# Patient Record
Sex: MALE | Race: WHITE | NOT HISPANIC OR LATINO | URBAN - METROPOLITAN AREA
[De-identification: names, ages, dates, MRNs, and addresses within clinical notes are randomized per-mention and may not be internally consistent; named-entity substitution may affect disease eponyms.]

---

## 2017-01-01 ENCOUNTER — EMERGENCY (EMERGENCY)
Facility: HOSPITAL | Age: 27
LOS: 1 days | Discharge: HOME | End: 2017-01-01
Attending: EMERGENCY MEDICINE
Payer: MEDICAID

## 2017-01-01 VITALS
SYSTOLIC BLOOD PRESSURE: 148 MMHG | TEMPERATURE: 98 F | OXYGEN SATURATION: 98 % | HEART RATE: 90 BPM | RESPIRATION RATE: 20 BRPM | DIASTOLIC BLOOD PRESSURE: 88 MMHG

## 2017-01-01 PROCEDURE — 99284 EMERGENCY DEPT VISIT MOD MDM: CPT | Mod: 25

## 2017-01-01 PROCEDURE — 73080 X-RAY EXAM OF ELBOW: CPT | Mod: 26,RT

## 2017-01-01 PROCEDURE — 10061 I&D ABSCESS COMP/MULTIPLE: CPT

## 2017-01-01 RX ADMIN — Medication 100 MILLIGRAM(S): at 12:27

## 2017-01-01 NOTE — ED PROVIDER NOTE - ATTENDING CONTRIBUTION TO CARE
I personally saw the patient with the PA, and completed the key components of the history and physical exam. I then discussed the management plan with the PA.   gen in nad resp clear cardiac no murmur msk skin : + right forearm abscess no signs nec fasc I and d neurovasc intact opiod resources given

## 2017-01-01 NOTE — ED ADULT TRIAGE NOTE - NS ED TRIAGE AVPU SCALE
Unresponsive - The patient is nonverbal and does not respond even when a painful stimulus is applied.

## 2017-01-03 ENCOUNTER — INPATIENT (INPATIENT)
Facility: HOSPITAL | Age: 27
LOS: 3 days | Discharge: HOME | DRG: 571 | End: 2017-01-07
Attending: HOSPITALIST | Admitting: HOSPITALIST
Payer: MEDICAID

## 2017-01-03 VITALS
RESPIRATION RATE: 18 BRPM | TEMPERATURE: 98 F | OXYGEN SATURATION: 96 % | WEIGHT: 220.02 LBS | SYSTOLIC BLOOD PRESSURE: 128 MMHG | HEIGHT: 76 IN | HEART RATE: 75 BPM | DIASTOLIC BLOOD PRESSURE: 78 MMHG

## 2017-01-03 DIAGNOSIS — L02.91 CUTANEOUS ABSCESS, UNSPECIFIED: ICD-10-CM

## 2017-01-03 DIAGNOSIS — F11.10 OPIOID ABUSE, UNCOMPLICATED: ICD-10-CM

## 2017-01-03 LAB
ALBUMIN SERPL ELPH-MCNC: 4.1 G/DL — SIGNIFICANT CHANGE UP (ref 3.3–5.2)
ALP SERPL-CCNC: 52 U/L — SIGNIFICANT CHANGE UP (ref 40–120)
ALT FLD-CCNC: 17 U/L — SIGNIFICANT CHANGE UP
ANION GAP SERPL CALC-SCNC: 13 MMOL/L — SIGNIFICANT CHANGE UP (ref 5–17)
AST SERPL-CCNC: 16 U/L — SIGNIFICANT CHANGE UP
BASOPHILS # BLD AUTO: 0 K/UL — SIGNIFICANT CHANGE UP (ref 0–0.2)
BASOPHILS NFR BLD AUTO: 0.6 % — SIGNIFICANT CHANGE UP (ref 0–2)
BILIRUB SERPL-MCNC: 0.4 MG/DL — SIGNIFICANT CHANGE UP (ref 0.4–2)
BUN SERPL-MCNC: 8 MG/DL — SIGNIFICANT CHANGE UP (ref 8–20)
CALCIUM SERPL-MCNC: 9.4 MG/DL — SIGNIFICANT CHANGE UP (ref 8.6–10.2)
CHLORIDE SERPL-SCNC: 99 MMOL/L — SIGNIFICANT CHANGE UP (ref 98–107)
CO2 SERPL-SCNC: 29 MMOL/L — SIGNIFICANT CHANGE UP (ref 22–29)
CREAT SERPL-MCNC: 0.73 MG/DL — SIGNIFICANT CHANGE UP (ref 0.5–1.3)
EOSINOPHIL # BLD AUTO: 0.4 K/UL — SIGNIFICANT CHANGE UP (ref 0–0.5)
EOSINOPHIL NFR BLD AUTO: 5.9 % — SIGNIFICANT CHANGE UP (ref 0–6)
GLUCOSE SERPL-MCNC: 86 MG/DL — SIGNIFICANT CHANGE UP (ref 70–115)
HCT VFR BLD CALC: 37.3 % — LOW (ref 42–52)
HGB BLD-MCNC: 12.9 G/DL — LOW (ref 14–18)
LYMPHOCYTES # BLD AUTO: 2.3 K/UL — SIGNIFICANT CHANGE UP (ref 1–4.8)
LYMPHOCYTES # BLD AUTO: 35.8 % — SIGNIFICANT CHANGE UP (ref 20–55)
MCHC RBC-ENTMCNC: 29.1 PG — SIGNIFICANT CHANGE UP (ref 27–31)
MCHC RBC-ENTMCNC: 34.6 G/DL — SIGNIFICANT CHANGE UP (ref 32–36)
MCV RBC AUTO: 84.2 FL — SIGNIFICANT CHANGE UP (ref 80–94)
MONOCYTES # BLD AUTO: 0.6 K/UL — SIGNIFICANT CHANGE UP (ref 0–0.8)
MONOCYTES NFR BLD AUTO: 9.5 % — SIGNIFICANT CHANGE UP (ref 3–10)
NEUTROPHILS # BLD AUTO: 3.1 K/UL — SIGNIFICANT CHANGE UP (ref 1.8–8)
NEUTROPHILS NFR BLD AUTO: 48 % — SIGNIFICANT CHANGE UP (ref 37–73)
PLATELET # BLD AUTO: 262 K/UL — SIGNIFICANT CHANGE UP (ref 150–400)
POTASSIUM SERPL-MCNC: 4.6 MMOL/L — SIGNIFICANT CHANGE UP (ref 3.5–5.3)
POTASSIUM SERPL-SCNC: 4.6 MMOL/L — SIGNIFICANT CHANGE UP (ref 3.5–5.3)
PROT SERPL-MCNC: 7.9 G/DL — SIGNIFICANT CHANGE UP (ref 6.6–8.7)
RBC # BLD: 4.43 M/UL — LOW (ref 4.6–6.2)
RBC # FLD: 13.1 % — SIGNIFICANT CHANGE UP (ref 11–15.6)
SODIUM SERPL-SCNC: 141 MMOL/L — SIGNIFICANT CHANGE UP (ref 135–145)
WBC # BLD: 6.39 K/UL — SIGNIFICANT CHANGE UP (ref 4.8–10.8)
WBC # FLD AUTO: 6.39 K/UL — SIGNIFICANT CHANGE UP (ref 4.8–10.8)

## 2017-01-03 PROCEDURE — 10061 I&D ABSCESS COMP/MULTIPLE: CPT | Mod: 78

## 2017-01-03 PROCEDURE — 73201 CT UPPER EXTREMITY W/DYE: CPT | Mod: 26,RT

## 2017-01-03 PROCEDURE — 73080 X-RAY EXAM OF ELBOW: CPT | Mod: 26,RT

## 2017-01-03 PROCEDURE — 99223 1ST HOSP IP/OBS HIGH 75: CPT

## 2017-01-03 PROCEDURE — 99284 EMERGENCY DEPT VISIT MOD MDM: CPT | Mod: 25

## 2017-01-03 PROCEDURE — 73090 X-RAY EXAM OF FOREARM: CPT | Mod: 26,LT

## 2017-01-03 RX ORDER — IBUPROFEN 200 MG
1 TABLET ORAL
Qty: 12 | Refills: 0 | OUTPATIENT
Start: 2017-01-03 | End: 2017-01-06

## 2017-01-03 RX ORDER — VANCOMYCIN HCL 1 G
1500 VIAL (EA) INTRAVENOUS EVERY 12 HOURS
Qty: 0 | Refills: 0 | Status: DISCONTINUED | OUTPATIENT
Start: 2017-01-03 | End: 2017-01-04

## 2017-01-03 RX ORDER — PIPERACILLIN AND TAZOBACTAM 4; .5 G/20ML; G/20ML
3.38 INJECTION, POWDER, LYOPHILIZED, FOR SOLUTION INTRAVENOUS EVERY 8 HOURS
Qty: 0 | Refills: 0 | Status: DISCONTINUED | OUTPATIENT
Start: 2017-01-03 | End: 2017-01-04

## 2017-01-03 RX ORDER — ACETAMINOPHEN 500 MG
650 TABLET ORAL EVERY 6 HOURS
Qty: 0 | Refills: 0 | Status: DISCONTINUED | OUTPATIENT
Start: 2017-01-03 | End: 2017-01-04

## 2017-01-03 RX ORDER — SODIUM CHLORIDE 9 MG/ML
3 INJECTION INTRAMUSCULAR; INTRAVENOUS; SUBCUTANEOUS ONCE
Qty: 0 | Refills: 0 | Status: COMPLETED | OUTPATIENT
Start: 2017-01-03 | End: 2017-01-03

## 2017-01-03 RX ORDER — MORPHINE SULFATE 50 MG/1
4 CAPSULE, EXTENDED RELEASE ORAL ONCE
Qty: 0 | Refills: 0 | Status: DISCONTINUED | OUTPATIENT
Start: 2017-01-03 | End: 2017-01-03

## 2017-01-03 RX ORDER — PIPERACILLIN AND TAZOBACTAM 4; .5 G/20ML; G/20ML
3.38 INJECTION, POWDER, LYOPHILIZED, FOR SOLUTION INTRAVENOUS ONCE
Qty: 0 | Refills: 0 | Status: COMPLETED | OUTPATIENT
Start: 2017-01-03 | End: 2017-01-03

## 2017-01-03 RX ORDER — ENOXAPARIN SODIUM 100 MG/ML
40 INJECTION SUBCUTANEOUS EVERY 24 HOURS
Qty: 0 | Refills: 0 | Status: DISCONTINUED | OUTPATIENT
Start: 2017-01-03 | End: 2017-01-04

## 2017-01-03 RX ADMIN — Medication 100 MILLIGRAM(S): at 18:29

## 2017-01-03 RX ADMIN — PIPERACILLIN AND TAZOBACTAM 200 GRAM(S): 4; .5 INJECTION, POWDER, LYOPHILIZED, FOR SOLUTION INTRAVENOUS at 22:25

## 2017-01-03 RX ADMIN — SODIUM CHLORIDE 3 MILLILITER(S): 9 INJECTION INTRAMUSCULAR; INTRAVENOUS; SUBCUTANEOUS at 17:26

## 2017-01-03 NOTE — H&P ADULT. - RS GEN PE MLT RESP DETAILS PC
clear to auscultation bilaterally/good air movement/no rales/airway patent/no chest wall tenderness/no intercostal retractions/respirations non-labored/breath sounds equal

## 2017-01-03 NOTE — ED STATDOCS - ATTENDING CONTRIBUTION TO CARE
I, Lenore Rodriguez, performed the initial face to face bedside interview with this patient regarding history of present illness, review of symptoms and relevant past medical, social and family history.  I completed an independent physical examination.  I was the initial provider who evaluated this patient. I have signed out the follow up of any pending tests (i.e. labs, radiological studies) to the ACP.  I have communicated the patient’s plan of care and disposition with the ACP.  The history, relevant review of systems, past medical and surgical history, medical decision making, and physical examination was documented by the scribe in my presence and I attest to the accuracy of the documentation.

## 2017-01-03 NOTE — H&P ADULT. - HISTORY OF PRESENT ILLNESS
25 y/o M w/ hx heroine abuse, Presents to the ED c/o right arm abscess , that started 4 days ago, he came at  the ED on sunday. had it draines, and dc in po abx.. Pt comes back, beacuse it has worsen in size, and very tender to palpations. Denies fever, chills , or any other abscess,  cp, sob, diarrhea, dysuria or any other complaints/ he states he has had this in the past.

## 2017-01-03 NOTE — H&P ADULT. - PROBLEM SELECTOR PLAN 1
ortho consult to r/o septic joint  abscess in right antecubital fossa. 2/2 to iv drug abuse. ..s/p I&D  started on vanco/zosyn...right upper ext edema..  f/u r-ct upper ext..   f/u bc, abscess cultures ortho consult dr henderson to r/o septic joint  abscess in right antecubital fossa. 2/2 to iv drug abuse. ..s/p I&D  started on vanco/zosyn...right upper ext edema..  f/u r-ct upper ext..   f/u bc, abscess cultures

## 2017-01-03 NOTE — ED STATDOCS - PROGRESS NOTE DETAILS
exam unchanged from attendings---pmd is dr gruber---I&D today=initially slight amount of pus and serosanguanous fluid then just blood---d/w dr ying--will f/u labs and xray then admit pt for iv abx--pt also will talk to social work about possible placement in rehab prgm lungs=cta,no retarctions--child walking around in no distress--will recheck vitals and they are ok will d/c home

## 2017-01-03 NOTE — ED STATDOCS - NS ED MD SCRIBE ATTENDING SCRIBE SECTIONS
REVIEW OF SYSTEMS/VITAL SIGNS( Pullset)/PHYSICAL EXAM/DISPOSITION/PAST MEDICAL/SURGICAL/SOCIAL HISTORY/HISTORY OF PRESENT ILLNESS/HIV

## 2017-01-03 NOTE — H&P ADULT. - EXTREMITIES COMMENTS
right upper ext abscess at antecub fossa...approx 4cm, now s/p I&D..erthema, tender and warm to palp.

## 2017-01-03 NOTE — H&P ADULT. - ASSESSMENT
Wade Grace [id number] pmd    27 y/o M w/ hx heroine abuse, Presents to the ED c/o right arm abscess , that started 4 days ago, he came at  the ED on sunday. had it draines, and dc in po abx.. Pt comes back, beacuse it has worsen in size, and very tender to palpations. Denies fever, chills , or any other abscess,  cp, sob, diarrhea, dysuria or any other complaints/ he states he has had this in the past. Wade Grace [id number] pmd dr [facility] Ortho consult......  27 y/o M w/ hx heroine abuse, Presents to the ED c/o right arm abscess , that started 4 days ago, he came at  the ED on sunday. had it draines, and dc in po abx.. Pt comes back, beacuse it has worsen in size, and very tender to palpations. Denies fever, chills , or any other abscess,  cp, sob, diarrhea, dysuria or any other complaints/ he states he has had this in the past. Exam; s/p I7 Wade Grace [id number] pmd dr [facility] Ortho consult..dr Arzate....  27 y/o M w/ hx heroine abuse, Presents to the ED c/o right arm abscess , that started 4 days ago, he came at  the ED on sunday. had it draines, and dc in po abx.. Pt comes back, beacuse it has worsen in size, and very tender to palpations. Denies fever, chills , or any other abscess,  cp, sob, diarrhea, dysuria or any other complaints/ he states he has had this in the past. Exam; s/p I7

## 2017-01-03 NOTE — ED ADULT NURSE NOTE - OBJECTIVE STATEMENT
pt c/o worsening r arm  drainage with swelling and redness, pt had abscess drained and packed 2 days ago. no fevers

## 2017-01-03 NOTE — ED STATDOCS - OBJECTIVE STATEMENT
25 y/o M pt w/ PMHx of IV drug abuse presents to the ED c/o R arm drainage and redness x2 days. Pt states that he was seen at [facility] ED 2 days ago for an abscess on R arm; pt had I&D and packing placed, but pulled out packing due to not draining. Pt denies fever, chills, or any other complaints. NKDA.

## 2017-01-03 NOTE — ED ADULT NURSE NOTE - CAS EDN DISCHARGE ASSESSMENT
Patient baseline mental status/Awake/Alert and oriented to person, place and time/No adverse reaction to first time med in ED/Dressing clean and dry/Symptoms improved

## 2017-01-04 LAB
ALBUMIN SERPL ELPH-MCNC: 3.8 G/DL — SIGNIFICANT CHANGE UP (ref 3.3–5.2)
ALP SERPL-CCNC: 49 U/L — SIGNIFICANT CHANGE UP (ref 40–120)
ALT FLD-CCNC: 15 U/L — SIGNIFICANT CHANGE UP
ANION GAP SERPL CALC-SCNC: 13 MMOL/L — SIGNIFICANT CHANGE UP (ref 5–17)
ANISOCYTOSIS BLD QL: SLIGHT — SIGNIFICANT CHANGE UP
AST SERPL-CCNC: 13 U/L — SIGNIFICANT CHANGE UP
BILIRUB SERPL-MCNC: 0.3 MG/DL — LOW (ref 0.4–2)
BUN SERPL-MCNC: 7 MG/DL — LOW (ref 8–20)
CALCIUM SERPL-MCNC: 9.3 MG/DL — SIGNIFICANT CHANGE UP (ref 8.6–10.2)
CHLORIDE SERPL-SCNC: 99 MMOL/L — SIGNIFICANT CHANGE UP (ref 98–107)
CO2 SERPL-SCNC: 27 MMOL/L — SIGNIFICANT CHANGE UP (ref 22–29)
CREAT SERPL-MCNC: 0.68 MG/DL — SIGNIFICANT CHANGE UP (ref 0.5–1.3)
EOSINOPHIL NFR BLD AUTO: 4 % — SIGNIFICANT CHANGE UP (ref 0–6)
GLUCOSE SERPL-MCNC: 97 MG/DL — SIGNIFICANT CHANGE UP (ref 70–115)
GRAM STN FLD: SIGNIFICANT CHANGE UP
GRAM STN FLD: SIGNIFICANT CHANGE UP
HCT VFR BLD CALC: 37.5 % — LOW (ref 42–52)
HGB BLD-MCNC: 13.2 G/DL — LOW (ref 14–18)
HYPOCHROMIA BLD QL: SLIGHT — SIGNIFICANT CHANGE UP
LYMPHOCYTES # BLD AUTO: 40 % — SIGNIFICANT CHANGE UP (ref 20–55)
MACROCYTES BLD QL: SLIGHT — SIGNIFICANT CHANGE UP
MCHC RBC-ENTMCNC: 29.4 PG — SIGNIFICANT CHANGE UP (ref 27–31)
MCHC RBC-ENTMCNC: 35.2 G/DL — SIGNIFICANT CHANGE UP (ref 32–36)
MCV RBC AUTO: 83.5 FL — SIGNIFICANT CHANGE UP (ref 80–94)
MONOCYTES NFR BLD AUTO: 11 % — HIGH (ref 3–10)
NEUTROPHILS NFR BLD AUTO: 44 % — SIGNIFICANT CHANGE UP (ref 37–73)
OVALOCYTES BLD QL SMEAR: SLIGHT — SIGNIFICANT CHANGE UP
PLAT MORPH BLD: NORMAL — SIGNIFICANT CHANGE UP
PLATELET # BLD AUTO: 256 K/UL — SIGNIFICANT CHANGE UP (ref 150–400)
POIKILOCYTOSIS BLD QL AUTO: SLIGHT — SIGNIFICANT CHANGE UP
POTASSIUM SERPL-MCNC: 4.3 MMOL/L — SIGNIFICANT CHANGE UP (ref 3.5–5.3)
POTASSIUM SERPL-SCNC: 4.3 MMOL/L — SIGNIFICANT CHANGE UP (ref 3.5–5.3)
PROT SERPL-MCNC: 7.4 G/DL — SIGNIFICANT CHANGE UP (ref 6.6–8.7)
RBC # BLD: 4.49 M/UL — LOW (ref 4.6–6.2)
RBC # FLD: 13 % — SIGNIFICANT CHANGE UP (ref 11–15.6)
RBC BLD AUTO: ABNORMAL
SODIUM SERPL-SCNC: 139 MMOL/L — SIGNIFICANT CHANGE UP (ref 135–145)
SPECIMEN SOURCE: SIGNIFICANT CHANGE UP
SPECIMEN SOURCE: SIGNIFICANT CHANGE UP
VARIANT LYMPHS # BLD: 1 % — SIGNIFICANT CHANGE UP (ref 0–6)
WBC # BLD: 3.57 K/UL — LOW (ref 4.8–10.8)
WBC # FLD AUTO: 3.57 K/UL — LOW (ref 4.8–10.8)

## 2017-01-04 PROCEDURE — 23930 I&D UPR A/E DP ABSC/HMTMA: CPT | Mod: RT

## 2017-01-04 PROCEDURE — 99233 SBSQ HOSP IP/OBS HIGH 50: CPT

## 2017-01-04 PROCEDURE — 99223 1ST HOSP IP/OBS HIGH 75: CPT | Mod: 25

## 2017-01-04 RX ORDER — ACETAMINOPHEN 500 MG
650 TABLET ORAL EVERY 6 HOURS
Qty: 0 | Refills: 0 | Status: DISCONTINUED | OUTPATIENT
Start: 2017-01-04 | End: 2017-01-07

## 2017-01-04 RX ORDER — OXYCODONE HYDROCHLORIDE 5 MG/1
5 TABLET ORAL
Qty: 0 | Refills: 0 | Status: DISCONTINUED | OUTPATIENT
Start: 2017-01-04 | End: 2017-01-07

## 2017-01-04 RX ORDER — ONDANSETRON 8 MG/1
4 TABLET, FILM COATED ORAL ONCE
Qty: 0 | Refills: 0 | Status: DISCONTINUED | OUTPATIENT
Start: 2017-01-04 | End: 2017-01-04

## 2017-01-04 RX ORDER — ALPRAZOLAM 0.25 MG
0.5 TABLET ORAL ONCE
Qty: 0 | Refills: 0 | Status: DISCONTINUED | OUTPATIENT
Start: 2017-01-04 | End: 2017-01-04

## 2017-01-04 RX ORDER — DOCUSATE SODIUM 100 MG
100 CAPSULE ORAL THREE TIMES A DAY
Qty: 0 | Refills: 0 | Status: DISCONTINUED | OUTPATIENT
Start: 2017-01-04 | End: 2017-01-07

## 2017-01-04 RX ORDER — METHADONE HYDROCHLORIDE 40 MG/1
10 TABLET ORAL
Qty: 0 | Refills: 0 | Status: DISCONTINUED | OUTPATIENT
Start: 2017-01-04 | End: 2017-01-04

## 2017-01-04 RX ORDER — HYDROMORPHONE HYDROCHLORIDE 2 MG/ML
1 INJECTION INTRAMUSCULAR; INTRAVENOUS; SUBCUTANEOUS
Qty: 0 | Refills: 0 | Status: DISCONTINUED | OUTPATIENT
Start: 2017-01-04 | End: 2017-01-04

## 2017-01-04 RX ORDER — MORPHINE SULFATE 50 MG/1
2 CAPSULE, EXTENDED RELEASE ORAL EVERY 6 HOURS
Qty: 0 | Refills: 0 | Status: DISCONTINUED | OUTPATIENT
Start: 2017-01-04 | End: 2017-01-07

## 2017-01-04 RX ORDER — SODIUM CHLORIDE 9 MG/ML
1000 INJECTION, SOLUTION INTRAVENOUS
Qty: 0 | Refills: 0 | Status: DISCONTINUED | OUTPATIENT
Start: 2017-01-04 | End: 2017-01-06

## 2017-01-04 RX ORDER — METHADONE HYDROCHLORIDE 40 MG/1
10 TABLET ORAL ONCE
Qty: 0 | Refills: 0 | Status: DISCONTINUED | OUTPATIENT
Start: 2017-01-04 | End: 2017-01-04

## 2017-01-04 RX ORDER — PIPERACILLIN AND TAZOBACTAM 4; .5 G/20ML; G/20ML
3.38 INJECTION, POWDER, LYOPHILIZED, FOR SOLUTION INTRAVENOUS EVERY 8 HOURS
Qty: 0 | Refills: 0 | Status: DISCONTINUED | OUTPATIENT
Start: 2017-01-04 | End: 2017-01-06

## 2017-01-04 RX ORDER — ONDANSETRON 8 MG/1
4 TABLET, FILM COATED ORAL EVERY 6 HOURS
Qty: 0 | Refills: 0 | Status: DISCONTINUED | OUTPATIENT
Start: 2017-01-04 | End: 2017-01-07

## 2017-01-04 RX ORDER — SODIUM CHLORIDE 9 MG/ML
1000 INJECTION, SOLUTION INTRAVENOUS
Qty: 0 | Refills: 0 | Status: DISCONTINUED | OUTPATIENT
Start: 2017-01-04 | End: 2017-01-04

## 2017-01-04 RX ORDER — ENOXAPARIN SODIUM 100 MG/ML
40 INJECTION SUBCUTANEOUS EVERY 24 HOURS
Qty: 0 | Refills: 0 | Status: DISCONTINUED | OUTPATIENT
Start: 2017-01-04 | End: 2017-01-07

## 2017-01-04 RX ORDER — VANCOMYCIN HCL 1 G
1500 VIAL (EA) INTRAVENOUS EVERY 12 HOURS
Qty: 0 | Refills: 0 | Status: DISCONTINUED | OUTPATIENT
Start: 2017-01-04 | End: 2017-01-06

## 2017-01-04 RX ORDER — FAMOTIDINE 10 MG/ML
20 INJECTION INTRAVENOUS EVERY 12 HOURS
Qty: 0 | Refills: 0 | Status: DISCONTINUED | OUTPATIENT
Start: 2017-01-04 | End: 2017-01-07

## 2017-01-04 RX ORDER — MAGNESIUM HYDROXIDE 400 MG/1
30 TABLET, CHEWABLE ORAL DAILY
Qty: 0 | Refills: 0 | Status: DISCONTINUED | OUTPATIENT
Start: 2017-01-04 | End: 2017-01-07

## 2017-01-04 RX ORDER — ACETAMINOPHEN 500 MG
1000 TABLET ORAL
Qty: 0 | Refills: 0 | Status: COMPLETED | OUTPATIENT
Start: 2017-01-04 | End: 2017-01-05

## 2017-01-04 RX ORDER — METHADONE HYDROCHLORIDE 40 MG/1
10 TABLET ORAL
Qty: 0 | Refills: 0 | Status: DISCONTINUED | OUTPATIENT
Start: 2017-01-04 | End: 2017-01-06

## 2017-01-04 RX ORDER — ONDANSETRON 8 MG/1
4 TABLET, FILM COATED ORAL EVERY 6 HOURS
Qty: 0 | Refills: 0 | Status: DISCONTINUED | OUTPATIENT
Start: 2017-01-04 | End: 2017-01-04

## 2017-01-04 RX ORDER — FENTANYL CITRATE 50 UG/ML
50 INJECTION INTRAVENOUS
Qty: 0 | Refills: 0 | Status: DISCONTINUED | OUTPATIENT
Start: 2017-01-04 | End: 2017-01-04

## 2017-01-04 RX ORDER — OXYCODONE HYDROCHLORIDE 5 MG/1
10 TABLET ORAL
Qty: 0 | Refills: 0 | Status: DISCONTINUED | OUTPATIENT
Start: 2017-01-04 | End: 2017-01-07

## 2017-01-04 RX ADMIN — METHADONE HYDROCHLORIDE 10 MILLIGRAM(S): 40 TABLET ORAL at 10:47

## 2017-01-04 RX ADMIN — PIPERACILLIN AND TAZOBACTAM 25 GRAM(S): 4; .5 INJECTION, POWDER, LYOPHILIZED, FOR SOLUTION INTRAVENOUS at 22:25

## 2017-01-04 RX ADMIN — Medication 0.5 MILLIGRAM(S): at 20:09

## 2017-01-04 RX ADMIN — ENOXAPARIN SODIUM 40 MILLIGRAM(S): 100 INJECTION SUBCUTANEOUS at 05:20

## 2017-01-04 RX ADMIN — Medication 300 MILLIGRAM(S): at 13:35

## 2017-01-04 RX ADMIN — Medication 300 MILLIGRAM(S): at 00:41

## 2017-01-04 RX ADMIN — HYDROMORPHONE HYDROCHLORIDE 1 MILLIGRAM(S): 2 INJECTION INTRAMUSCULAR; INTRAVENOUS; SUBCUTANEOUS at 18:38

## 2017-01-04 RX ADMIN — HYDROMORPHONE HYDROCHLORIDE 1 MILLIGRAM(S): 2 INJECTION INTRAMUSCULAR; INTRAVENOUS; SUBCUTANEOUS at 18:54

## 2017-01-04 RX ADMIN — METHADONE HYDROCHLORIDE 10 MILLIGRAM(S): 40 TABLET ORAL at 20:24

## 2017-01-04 RX ADMIN — PIPERACILLIN AND TAZOBACTAM 25 GRAM(S): 4; .5 INJECTION, POWDER, LYOPHILIZED, FOR SOLUTION INTRAVENOUS at 05:20

## 2017-01-04 NOTE — ED ADULT NURSE REASSESSMENT NOTE - NS ED NURSE REASSESS COMMENT FT1
Pt received A&OX3, amb ad lory, denies any pain.  VSS.  Pt states he feels like he is withdrawing from heroine.  Pt last used yesterday.  Pt is very restless and has generalized soreness.  Dr. Toro called and made aware.  Clear bsb, abd soft nondistended, nontender, moving all ext well. Pt received A&OX3, amb ad lory, denies any pain.  VSS.  Pt states he feels like he is withdrawing from heroine.  Pt last used yesterday.  Pt is very restless and has generalized soreness.  Dr. Toro called and made aware.  RAC wound is red, draining serosanguinous drainage.  DSD in place and maintained.  Clear bsb, abd soft nondistended, nontender, moving all ext well.

## 2017-01-04 NOTE — ED BEHAVIORAL HEALTH NOTE - BEHAVIORAL HEALTH NOTE
Naloxone Rescue Kit dispensed: Pt was educated about Naloxone and trained on how to assemble and utilize the kit.  Provided SBIRT services: Full screen positive. Referral to Treatment attempted. Screening results were reviewed with the patient and patient was provided information about healthy guidelines and potential negative consequences associated with level of risk. Motivation and readiness to reduce or stop use was discussed and goals and activities to make changes were suggested/offered.  Options discussed for further evaluation and treatment, but referral to treatment was not completed because  Patient requires medical care  Audit Score:3  DAST Score:10  Duration = 75 Minutes

## 2017-01-04 NOTE — CONSULT NOTE ADULT - SUBJECTIVE AND OBJECTIVE BOX
Pt Name: CARLYN NAGY    MRN: [id number]    Patient is a 26y Male presenting to the emergency department with a chief complaint of right arm abscess (03 Jan 2017 21:37)  Patient states he first noticed pain and swelling to arm 5 days ago, he was treated in ED 3 days ago with I&D/ wound packing/antibiotics.  Patient returned to ED last night c/o worsening swelling and pain  Patient admits to IV drug abuse  Denies CP, SOB, Fever, chills, N/V    HEALTH ISSUES - PROBLEM Dx:  Heroin abuse: Heroin abuse  Abscess: Abscess    Musculoskeletal:   + swelling/erythema/active purulent drainage superior to antecubital fossa	    ROS is otherwise negative.    PAST MEDICAL & SURGICAL HISTORY:  PAST MEDICAL & SURGICAL HISTORY:  Abscess  Heroin abuse  No significant past surgical history    Allergies: No Known Allergies    Medications: vancomycin  IVPB 1500milliGRAM(s) IV Intermittent every 12 hours  piperacillin/tazobactam IVPB. 3.375Gram(s) IV Intermittent every 8 hours  acetaminophen   Tablet 650milliGRAM(s) Oral every 6 hours PRN  methadone 10milliGRAM(s) Oral two times a day    FAMILY HISTORY:  No pertinent family history in first degree relatives  : non-contributory    Social History: IVDA                          13.2   3.57  )-----------( 256      ( 04 Jan 2017 05:53 )             37.5     04 Jan 2017 05:53    139    |  99     |  7.0    ----------------------------<  97     4.3     |  27.0   |  0.68     Ca    9.3        04 Jan 2017 05:53    TPro  7.4    /  Alb  3.8    /  TBili  0.3    /  DBili  x      /  AST  13     /  ALT  15     /  AlkPhos  49     04 Jan 2017 05:53      PHYSICAL EXAM:    Vital Signs Last 24 Hrs  T(C): 36.6, Max: 37.1 (01-04 @ 00:34)  T(F): 97.9, Max: 98.7 (01-04 @ 00:34)  HR: 55 (55 - 75)  BP: 118/70 (107/60 - 128/78)  BP(mean): --  RR: 16 (16 - 19)  SpO2: 98% (96% - 100%)  Daily Height in cm: 193.04 (03 Jan 2017 17:18)    Daily     Appearance: Alert, responsive, in no acute distress.    Musculoskeletal:      Right Upper Extremity:  Dressing removed  + purulent drainage with packing  limited AROM of elbow secondary to pain  distal motor function/sensation to light touch intact  radial pulse +2  cap refill less than 2 seconds  New dressing applied    Imaging Studies:    Xrays negative for acute fracture or dislocation    CT report RUE: Small abscess at the level of the antecubital fossa as described above, most  likely with an intramuscular component in the superficial portions of the  distal biceps brachii muscle, just proximal to the myotendinous junction    A/P:  Pt is a 26y Male with right arm abscess (03 Jan 2017 21:37)    PLAN:   * NPO for OR today  * Continue IV fluids/ Abx  * Pain control Pt Name: CARLYN NAGY    MRN: [id number]    Patient is a 26y Male presenting to the emergency department with a chief complaint of right arm abscess (03 Jan 2017 21:37)  Patient states he first noticed pain and swelling to arm 5 days ago, he was treated in ED 3 days ago with I&D/ wound packing/antibiotics.  Patient returned to ED last night c/o worsening swelling and pain  Patient admits to IV drug abuse  Denies CP, SOB, Fever, chills, N/V    HEALTH ISSUES - PROBLEM Dx:  Heroin abuse: Heroin abuse, No past ortho hx  Abscess: Abscess    Musculoskeletal:   + swelling/erythema/active purulent drainage superior to antecubital fossa	    ROS is otherwise negative.    PAST MEDICAL & SURGICAL HISTORY:  PAST MEDICAL & SURGICAL HISTORY:  Abscess  Heroin abuse  No significant past surgical history    Allergies: No Known Allergies    Medications: vancomycin  IVPB 1500milliGRAM(s) IV Intermittent every 12 hours  piperacillin/tazobactam IVPB. 3.375Gram(s) IV Intermittent every 8 hours  acetaminophen   Tablet 650milliGRAM(s) Oral every 6 hours PRN  methadone 10milliGRAM(s) Oral two times a day    FAMILY HISTORY:  No pertinent family history in first degree relatives  : non-contributory    Social History: IVDA  ROS:  Pt denies fevers  Right arm pain swelling                        13.2   3.57  )-----------( 256      ( 04 Jan 2017 05:53 )             37.5     04 Jan 2017 05:53    139    |  99     |  7.0    ----------------------------<  97     4.3     |  27.0   |  0.68     Ca    9.3        04 Jan 2017 05:53    TPro  7.4    /  Alb  3.8    /  TBili  0.3    /  DBili  x      /  AST  13     /  ALT  15     /  AlkPhos  49     04 Jan 2017 05:53      PHYSICAL EXAM:    Vital Signs Last 24 Hrs  T(C): 36.6, Max: 37.1 (01-04 @ 00:34)  T(F): 97.9, Max: 98.7 (01-04 @ 00:34)  HR: 55 (55 - 75)  BP: 118/70 (107/60 - 128/78)  BP(mean): --  RR: 16 (16 - 19)  SpO2: 98% (96% - 100%)  Daily Height in cm: 193.04 (03 Jan 2017 17:18)    Daily     Appearance: Alert, responsive, in no acute distress.    Musculoskeletal:      Right Upper Extremity:  Dressing removed  + purulent drainage with packing  limited AROM of elbow secondary to pain  distal motor function/sensation to light touch intact  radial pulse +2  cap refill less than 2 seconds  New dressing applied    Imaging Studies:    Xrays negative for acute fracture or dislocation    CT report RUE: Small abscess at the level of the antecubital fossa as described above, most  likely with an intramuscular component in the superficial portions of the  distal biceps brachii muscle, just proximal to the myotendinous junction    A/P:  Pt is a 26y Male with right arm abscess (03 Jan 2017 21:37)    PLAN:   * NPO for OR today  * Continue IV fluids/ Abx  * Pain control  Pt will be brought to OR for I&D. Discussed disasterous complications from IVDU and highly recommended and told pt of cessation and will contact social service for rerhab effortsd. To OR

## 2017-01-04 NOTE — PROGRESS NOTE ADULT - SUBJECTIVE AND OBJECTIVE BOX
CHIEF COMPLAINT/INTERVAL HISTORY:  Pt. seen and evaluated for abcess/cellulitis of the R. UE.  Pt. is in no distress.  Afebrile.  Tolerating IV antibiotics.     REVIEW OF SYSTEMS:  No fever, CP, or SOB.    Vital Signs Last 24 Hrs  T(C): 36.6, Max: 37.1 (01-04 @ 00:34)  T(F): 97.9, Max: 98.7 (01-04 @ 00:34)  HR: 55 (55 - 75)  BP: 118/70 (107/60 - 128/78)  BP(mean): --  RR: 16 (16 - 19)  SpO2: 98% (96% - 100%)    PHYSICAL EXAM:  GENERAL: NAD  HEENT: EOMI, hearing normal, conjunctiva and sclera clear  Chest: CTA bilaterally, no wheezing  CV: S1S2, RRR,   GI: soft, +BS, NT/ND  Musculoskeletal: no edema, clean and dry dressing over RUE.  Psychiatric: affect nL, mood nL  Skin: warm and dry    LABS:                        13.2   3.57  )-----------( 256      ( 04 Jan 2017 05:53 )             37.5     04 Jan 2017 05:53    139    |  99     |  7.0    ----------------------------<  97     4.3     |  27.0   |  0.68     Ca    9.3        04 Jan 2017 05:53    TPro  7.4    /  Alb  3.8    /  TBili  0.3    /  DBili  x      /  AST  13     /  ALT  15     /  AlkPhos  49     04 Jan 2017 05:53    Assessment and Plan:  1) Cellulitis and abscess of the RUE: continue Zosyn and Vancomycin.  OR today per ortho.  2) Heroine abuse: methadone 10mg PO BID for withdrawal

## 2017-01-05 LAB
ANION GAP SERPL CALC-SCNC: 14 MMOL/L — SIGNIFICANT CHANGE UP (ref 5–17)
BUN SERPL-MCNC: 13 MG/DL — SIGNIFICANT CHANGE UP (ref 8–20)
CALCIUM SERPL-MCNC: 9.6 MG/DL — SIGNIFICANT CHANGE UP (ref 8.6–10.2)
CHLORIDE SERPL-SCNC: 101 MMOL/L — SIGNIFICANT CHANGE UP (ref 98–107)
CO2 SERPL-SCNC: 25 MMOL/L — SIGNIFICANT CHANGE UP (ref 22–29)
CREAT SERPL-MCNC: 0.7 MG/DL — SIGNIFICANT CHANGE UP (ref 0.5–1.3)
GLUCOSE SERPL-MCNC: 164 MG/DL — HIGH (ref 70–115)
HCT VFR BLD CALC: 38.4 % — LOW (ref 42–52)
HGB BLD-MCNC: 13.5 G/DL — LOW (ref 14–18)
MAGNESIUM SERPL-MCNC: 2.1 MG/DL — SIGNIFICANT CHANGE UP (ref 1.8–2.5)
MCHC RBC-ENTMCNC: 29.4 PG — SIGNIFICANT CHANGE UP (ref 27–31)
MCHC RBC-ENTMCNC: 35.2 G/DL — SIGNIFICANT CHANGE UP (ref 32–36)
MCV RBC AUTO: 83.7 FL — SIGNIFICANT CHANGE UP (ref 80–94)
PLATELET # BLD AUTO: 293 K/UL — SIGNIFICANT CHANGE UP (ref 150–400)
POTASSIUM SERPL-MCNC: 4.1 MMOL/L — SIGNIFICANT CHANGE UP (ref 3.5–5.3)
POTASSIUM SERPL-SCNC: 4.1 MMOL/L — SIGNIFICANT CHANGE UP (ref 3.5–5.3)
RBC # BLD: 4.59 M/UL — LOW (ref 4.6–6.2)
RBC # FLD: 12.7 % — SIGNIFICANT CHANGE UP (ref 11–15.6)
SODIUM SERPL-SCNC: 140 MMOL/L — SIGNIFICANT CHANGE UP (ref 135–145)
VANCOMYCIN TROUGH SERPL-MCNC: 13.9 UG/ML — SIGNIFICANT CHANGE UP (ref 10–20)
VANCOMYCIN TROUGH SERPL-MCNC: 7 UG/ML — LOW (ref 10–20)
WBC # BLD: 6.16 K/UL — SIGNIFICANT CHANGE UP (ref 4.8–10.8)
WBC # FLD AUTO: 6.16 K/UL — SIGNIFICANT CHANGE UP (ref 4.8–10.8)

## 2017-01-05 PROCEDURE — 99233 SBSQ HOSP IP/OBS HIGH 50: CPT

## 2017-01-05 PROCEDURE — 99222 1ST HOSP IP/OBS MODERATE 55: CPT

## 2017-01-05 RX ORDER — INFLUENZA VIRUS VACCINE 15; 15; 15; 15 UG/.5ML; UG/.5ML; UG/.5ML; UG/.5ML
0.5 SUSPENSION INTRAMUSCULAR ONCE
Qty: 0 | Refills: 0 | Status: COMPLETED | OUTPATIENT
Start: 2017-01-05 | End: 2017-01-05

## 2017-01-05 RX ORDER — ALPRAZOLAM 0.25 MG
0.5 TABLET ORAL ONCE
Qty: 0 | Refills: 0 | Status: DISCONTINUED | OUTPATIENT
Start: 2017-01-05 | End: 2017-01-05

## 2017-01-05 RX ADMIN — Medication 100 MILLIGRAM(S): at 05:52

## 2017-01-05 RX ADMIN — FAMOTIDINE 20 MILLIGRAM(S): 10 INJECTION INTRAVENOUS at 05:53

## 2017-01-05 RX ADMIN — Medication 300 MILLIGRAM(S): at 05:56

## 2017-01-05 RX ADMIN — PIPERACILLIN AND TAZOBACTAM 25 GRAM(S): 4; .5 INJECTION, POWDER, LYOPHILIZED, FOR SOLUTION INTRAVENOUS at 13:25

## 2017-01-05 RX ADMIN — Medication 100 MILLIGRAM(S): at 13:26

## 2017-01-05 RX ADMIN — SODIUM CHLORIDE 100 MILLILITER(S): 9 INJECTION, SOLUTION INTRAVENOUS at 13:26

## 2017-01-05 RX ADMIN — Medication 100 MILLIGRAM(S): at 21:38

## 2017-01-05 RX ADMIN — Medication 300 MILLIGRAM(S): at 18:29

## 2017-01-05 RX ADMIN — METHADONE HYDROCHLORIDE 10 MILLIGRAM(S): 40 TABLET ORAL at 18:29

## 2017-01-05 RX ADMIN — METHADONE HYDROCHLORIDE 10 MILLIGRAM(S): 40 TABLET ORAL at 05:52

## 2017-01-05 RX ADMIN — Medication 0.5 MILLIGRAM(S): at 21:38

## 2017-01-05 RX ADMIN — PIPERACILLIN AND TAZOBACTAM 25 GRAM(S): 4; .5 INJECTION, POWDER, LYOPHILIZED, FOR SOLUTION INTRAVENOUS at 21:38

## 2017-01-05 RX ADMIN — FAMOTIDINE 20 MILLIGRAM(S): 10 INJECTION INTRAVENOUS at 18:29

## 2017-01-05 RX ADMIN — PIPERACILLIN AND TAZOBACTAM 25 GRAM(S): 4; .5 INJECTION, POWDER, LYOPHILIZED, FOR SOLUTION INTRAVENOUS at 05:51

## 2017-01-05 RX ADMIN — ENOXAPARIN SODIUM 40 MILLIGRAM(S): 100 INJECTION SUBCUTANEOUS at 05:57

## 2017-01-05 NOTE — PROGRESS NOTE ADULT - SUBJECTIVE AND OBJECTIVE BOX
Patient seen and examined at bedside. Comfortable in bed. Pain controlled. Denies SOB/chest pain, abdominal pain, numbness/tingling. No complaints.    Vital Signs Last 24 Hrs  T(C): 36.4, Max: 37.3 (01-04 @ 17:50)  T(F): 97.5, Max: 99.1 (01-04 @ 17:50)  HR: 66 (51 - 66)  BP: 116/67 (93/66 - 135/83)  BP(mean): --  RR: 18 (15 - 19)  SpO2: 99% (94% - 99%)    RUE: Ace bandage dressing C/D/I. Sensation in tact distally. + FROM phalanges, + thumb abduction. Radial pulse 2+. Cap refill brisk. Compartments soft.    Culture - Abscess with Gram Stain (01.04.17 @ 21:52)    Gram Stain:   Rare White blood cells  No organisms seen    Specimen Source: .Abscess right arm abscess #2 (swabs)    Culture - Abscess with Gram Stain (01.04.17 @ 21:50)    Gram Stain:   Rare White blood cells  No organisms seen    Specimen Source: .Abscess right arm abscess #1 (swabs)      A/P: 26 y.o M s/p I&D right upper extremity abscess  - F/U cultures  - continue antibiotics as per ID  - continue care primary team Patient seen and examined at bedside. Comfortable in bed. Pain controlled. Denies SOB/chest pain, abdominal pain, numbness/tingling. No complaints.    Vital Signs Last 24 Hrs  T(C): 36.4, Max: 37.3 (01-04 @ 17:50)  T(F): 97.5, Max: 99.1 (01-04 @ 17:50)  HR: 66 (51 - 66)  BP: 116/67 (93/66 - 135/83)  BP(mean): --  RR: 18 (15 - 19)  SpO2: 99% (94% - 99%)    RUE: Ace bandage dressing C/D/I. Sutures noted, in tact. no erythema. no fluctuance. small amount of slow oozing blood from incision. Sensation in tact distally.  + good ROM elbow. + FROM phalanges, + thumb abduction. Radial pulse 2+. Cap refill brisk. Compartments soft.    Culture - Abscess with Gram Stain (01.04.17 @ 21:52)    Gram Stain:   Rare White blood cells  No organisms seen    Specimen Source: .Abscess right arm abscess #2 (swabs)    Culture - Abscess with Gram Stain (01.04.17 @ 21:50)    Gram Stain:   Rare White blood cells  No organisms seen    Specimen Source: .Abscess right arm abscess #1 (swabs)      A/P: 26 y.o M s/p I&D right upper extremity abscess  - F/U cultures  - continue IV antibiotics as per ID  - Bulky dressing applied  - continue care primary team

## 2017-01-05 NOTE — PROGRESS NOTE ADULT - SUBJECTIVE AND OBJECTIVE BOX
CHIEF COMPLAINT/INTERVAL HISTORY:  Pt. seen and evaluated for RUE abscess.  s/p I&D yesterday.  In no distress.  Tolerating IV antibiotics.    REVIEW OF SYSTEMS:  No fever, CP, or SOB.    Vital Signs Last 24 Hrs  T(C): 36.4, Max: 37.3 (01-04 @ 17:50)  T(F): 97.5, Max: 99.1 (01-04 @ 17:50)  HR: 66 (51 - 66)  BP: 116/67 (93/66 - 135/83)  BP(mean): --  RR: 18 (15 - 19)  SpO2: 99% (94% - 99%)    PHYSICAL EXAM:  GENERAL: NAD  HEENT: EOMI, hearing normal, conjunctiva and sclera clear  Chest: CTA bilaterally, no wheezing  CV: S1S2, RRR,   GI: soft, +BS, NT/ND  Musculoskeletal: dressing over RUE  Psychiatric: affect nL, mood nL  Skin: warm and dry    LABS:                        13.2   3.57  )-----------( 256      ( 04 Jan 2017 05:53 )             37.5     04 Jan 2017 05:53    139    |  99     |  7.0    ----------------------------<  97     4.3     |  27.0   |  0.68     Ca    9.3        04 Jan 2017 05:53    TPro  7.4    /  Alb  3.8    /  TBili  0.3    /  DBili  x      /  AST  13     /  ALT  15     /  AlkPhos  49     04 Jan 2017 05:53      Assessment and Plan:  1) Cellulitis and abscess of the RUE:  s/p I&D.  continue Zosyn and Vancomycin.  Pain control PRN.  Awaiting culture from abscess and blood cx.  ID consult.  Ortho f/u.   2) Heroine abuse: methadone 10mg PO BID  3) VTE ppx: Lovenox 40mg SQ daily

## 2017-01-05 NOTE — CONSULT NOTE ADULT - PROBLEM SELECTOR RECOMMENDATION 9
1-Patient is stable, pain is controlled  2-Continue current medication regimen as very effective  3-Recommend no Opioids on discharge, and out patient re-hab  4-Patient agrees with above plan, discussed with nurse  5-Will sign off, re-consult as needed

## 2017-01-05 NOTE — CONSULT NOTE ADULT - SUBJECTIVE AND OBJECTIVE BOX
Chief Complaint:    HPI:  27 y/o M w/ hx heroine abuse, Presents to the ED c/o right arm abscess , that started 4 days ago, he came at  the ED on sunday. had it draines, and dc in po abx.. Pt comes back, beacuse it has worsen in size, and very tender to palpations. Denies fever, chills , or any other abscess,  cp, sob, diarrhea, dysuria or any other complaints/ he states he has had this in the past. (03 Jan 2017 21:37)      PAST MEDICAL & SURGICAL HISTORY:  Abscess  Heroin abuse  No significant past surgical history      FAMILY HISTORY:  No pertinent family history in first degree relatives      SOCIAL HISTORY:  [ ] Denies Smoking, Alcohol, or Drug Use    Allergies    No Known Allergies    Intolerances        PAIN MEDICATIONS:  oxyCODONE IR 5milliGRAM(s) Oral every 3 hours PRN  oxyCODONE IR 10milliGRAM(s) Oral every 3 hours PRN  morphine  - Injectable 2milliGRAM(s) IV Push every 6 hours PRN  acetaminophen   Tablet. 650milliGRAM(s) Oral every 6 hours PRN  methadone 10milliGRAM(s) Oral two times a day  ondansetron Injectable 4milliGRAM(s) IV Push every 6 hours PRN    Heme:  enoxaparin Injectable 40milliGRAM(s) SubCutaneous every 24 hours    Antibiotics:  piperacillin/tazobactam IVPB. 3.375Gram(s) IV Intermittent every 8 hours  vancomycin  IVPB 1500milliGRAM(s) IV Intermittent every 12 hours    Cardiovascular:    GI:  famotidine    Tablet 20milliGRAM(s) Oral every 12 hours  magnesium hydroxide Suspension 30milliLiter(s) Oral daily PRN  docusate sodium 100milliGRAM(s) Oral three times a day    Endocrine:    All Other Medications:  dextrose 5% + sodium chloride 0.9%. 1000milliLiter(s) IV Continuous <Continuous>  influenza   Vaccine 0.5milliLiter(s) IntraMuscular once      REVIEW OF SYSTEMS:    CONSTITUTIONAL: No fever, weight loss, or fatigue  EYES: No eye pain, visual disturbances, or discharge  ENMT:  No difficulty hearing, tinnitus, vertigo; No sinus or throat pain  NECK: No pain or stiffness  BREASTS: No pain, masses, or nipple discharge  RESPIRATORY: No cough, wheezing, chills or hemoptysis; No shortness of breath  CARDIOVASCULAR: No chest pain, palpitations, dizziness, or leg swelling  GASTROINTESTINAL: No abdominal or epigastric pain. No nausea, vomiting, or hematemesis; No diarrhea or constipation. No melena or hematochezia.  GENITOURINARY: No dysuria, frequency, hematuria, or incontinence  NEUROLOGICAL: No headaches, memory loss, loss of strength, numbness, or tremors  SKIN: No itching, burning, rashes, or lesions   LYMPH NODES: No enlarged glands  ENDOCRINE: No heat or cold intolerance; No hair loss  MUSCULOSKELETAL: No joint pain or swelling; No muscle, back, or extremity pain  PSYCHIATRIC: No depression, anxiety, mood swings, or difficulty sleeping  HEME/LYMPH: No easy bruising, or bleeding gums  ALLERY AND IMMUNOLOGIC: No hives or eczema      Vital Signs Last 24 Hrs  T(C): 36.4, Max: 37.3 (01-04 @ 17:50)  T(F): 97.5, Max: 99.1 (01-04 @ 17:50)  HR: 66 (51 - 66)  BP: 116/67 (93/66 - 135/83)  BP(mean): --  RR: 18 (15 - 19)  SpO2: 99% (94% - 99%)    PAIN SCORE:         SCALE USED: (1-10 VNRS)             CONSTITUTIONAL: Well-appearing; well nourished; in no apparrent distress.  HEAD: Normocephalic, atraumatic.  EYES: PERRL, EOM intact, conjunctiva and sclera WNL  NECK/LYMPH: Supple  LUNGS:Normal chest excursion with respiration  ABD/GI: Normal bowel sounds; non-distended, non-tender, no palpable organomegly.  Back: No evidence of deformity, or step off noted.  Mild pain to palpation of the para-spinal area.   EXT/MS: Normal ROM in all four extremities; non-tender to palpation; distal pulses are normal.  SKIN: Warm and dry; good skin turgor; no apparrent lesions or exudate.  NEURO: Awake, alert and oriented X3, no gross deficits        LABS:                          13.5   6.16  )-----------( 293      ( 05 Jan 2017 08:03 )             38.4     05 Jan 2017 08:06    140    |  101    |  13.0   ----------------------------<  164    4.1     |  25.0   |  0.70     Ca    9.6        05 Jan 2017 08:06  Mg     2.1       05 Jan 2017 08:06    TPro  7.4    /  Alb  3.8    /  TBili  0.3    /  DBili  x      /  AST  13     /  ALT  15     /  AlkPhos  49     04 Jan 2017 05:53          RADIOLOGY:    Drug Screen:            [x ]  [place]  Reviewed and Copied to Chart     This report was requested by: Jeri Sommers | Reference #: [id number]  Others' Prescriptions  Patient Name: 	Caleb Olvera 	YOB: 1990  Address: 	[address] [place] [address] [place] [address] 	Sex: 	Male  Rx Written 	Rx Dispensed 	Drug 	Quantity 	Days Supply 	Prescriber Name  10/25/2016 	10/25/2016 	hydrocodone-acetaminophen 7.5-325 tab 	18 	4 	Saad Roldan DDS  07/05/2016 07/05/2016 	hydrocodone-acetaminophen 7.5-325 tab 	18 	3 	Saad Roldan DDS  03/14/2016 03/14/2016 	oxycodone-acetaminophen 5-325 mg tab 	30 	5 	Brian Mccauley (PA)    * - Drugs marked with an asterisk are compound drugs. If the compound drug is made up of more than one controlled substance, then each controlled substance will be a separate row in the table. Chief Complaint:    HPI:  27 y/o male IVDA /heroin, returned to the ED for re-evaluation of right arm abscess.  Patient states abscess was   I & D in the ED, and d/c home with oral antibiotic, with no improvement. Patient admits abscess has increased in side, and worsening pain.  Denies fever, chills,  cp, sob, diarrhea, dysuria or any other complaints, numbness or tingling.  Patient admits he has had abscess in the past.       PAST MEDICAL & SURGICAL HISTORY:  Abscess  Heroin abuse  No significant past surgical history      FAMILY HISTORY:  No pertinent family history in first degree relatives      SOCIAL HISTORY:  [ ] Denies Smoking, Alcohol, or Drug Use    Allergies    No Known Allergies    Intolerances        PAIN MEDICATIONS:  oxyCODONE IR 5milliGRAM(s) Oral every 3 hours PRN  oxyCODONE IR 10milliGRAM(s) Oral every 3 hours PRN  morphine  - Injectable 2milliGRAM(s) IV Push every 6 hours PRN  acetaminophen   Tablet. 650milliGRAM(s) Oral every 6 hours PRN  methadone 10milliGRAM(s) Oral two times a day  ondansetron Injectable 4milliGRAM(s) IV Push every 6 hours PRN    Heme:  enoxaparin Injectable 40milliGRAM(s) SubCutaneous every 24 hours    Antibiotics:  piperacillin/tazobactam IVPB. 3.375Gram(s) IV Intermittent every 8 hours  vancomycin  IVPB 1500milliGRAM(s) IV Intermittent every 12 hours    Cardiovascular:    GI:  famotidine    Tablet 20milliGRAM(s) Oral every 12 hours  magnesium hydroxide Suspension 30milliLiter(s) Oral daily PRN  docusate sodium 100milliGRAM(s) Oral three times a day    Endocrine:    All Other Medications:  dextrose 5% + sodium chloride 0.9%. 1000milliLiter(s) IV Continuous <Continuous>  influenza   Vaccine 0.5milliLiter(s) IntraMuscular once      REVIEW OF SYSTEMS:    CONSTITUTIONAL: No fever, weight loss, or fatigue  NECK: No pain or stiffness  RESPIRATORY: No cough, wheezing, chills or hemoptysis; No shortness of breath  GENITOURINARY: No dysuria, frequency, hematuria, or incontinence  NEUROLOGICAL: No headaches, memory loss, loss of strength, numbness, or tremors  SKIN: No itching, burning, abscess  MUSCULOSKELETAL: + joint pain and swelling; No muscle, back          Vital Signs Last 24 Hrs  T(C): 36.4, Max: 37.3 (01-04 @ 17:50)  T(F): 97.5, Max: 99.1 (01-04 @ 17:50)  HR: 66 (51 - 66)  BP: 116/67 (93/66 - 135/83)  BP(mean): --  RR: 18 (15 - 19)  SpO2: 99% (94% - 99%)    PAIN SCORE:         SCALE USED: (1-10 VNRS)             CONSTITUTIONAL: Well-appearing; well nourished; in no apparent distress.  HEAD: Normocephalic, atraumatic.  EYES: PERRL, EOM intact, conjunctiva and sclera WNL  NECK/LYMPH: Supple  LUNGS: Normal chest excursion with respiration  ABD/GI: Normal bowel sounds; non-distended, non-tender, no palpable organomegaly  Back: No evidence of deformity, or step off noted.  Non tender to palpation of the para-spinal area.   EXT/MS: Normal ROM in all four extremities; non-tender to palpation; distal pulses are normal.  SKIN: Warm and dry; good skin turgor; RUE:  Dressing C/D/I  NEURO: Awake, alert and oriented X3, no gross deficits        LABS:                          13.5   6.16  )-----------( 293      ( 05 Jan 2017 08:03 )             38.4     05 Jan 2017 08:06    140    |  101    |  13.0   ----------------------------<  164    4.1     |  25.0   |  0.70     Ca    9.6        05 Jan 2017 08:06  Mg     2.1       05 Jan 2017 08:06    TPro  7.4    /  Alb  3.8    /  TBili  0.3    /  DBili  x      /  AST  13     /  ALT  15     /  AlkPhos  49     04 Jan 2017 05:53          RADIOLOGY:    Drug Screen:            [x ]  [place]  Reviewed and Copied to Chart     This report was requested by: Jeri Sommers | Reference #: [id number]  Others' Prescriptions  Patient Name: 	Caleb Olvera 	YOB: 1990  Address: 	[address] [place] [address] [place] [address] 	Sex: 	Male  Rx Written 	Rx Dispensed 	Drug 	Quantity 	Days Supply 	Prescriber Name  10/25/2016 	10/25/2016 	hydrocodone-acetaminophen 7.5-325 tab 	18 	4 	Saad Roldan DDS  07/05/2016 07/05/2016 	hydrocodone-acetaminophen 7.5-325 tab 	18 	3 	Saad Roldan DDS  03/14/2016 03/14/2016 	oxycodone-acetaminophen 5-325 mg tab 	30 	5 	Brian Mccauley (PA)    * - Drugs marked with an asterisk are compound drugs. If the compound drug is made up of more than one controlled substance, then each controlled substance will be a separate row in the table.

## 2017-01-05 NOTE — CONSULT NOTE ADULT - ASSESSMENT
A/P: 26 y.o M s/p I&D right upper extremity abscess  - F/U cultures  - continue IV antibiotics as per ID  - Bulky dressing applied  - continue care primary team A/P: 27 y/o male IVDA s/p:   I&D right upper extremity abscess

## 2017-01-05 NOTE — PROGRESS NOTE ADULT - SUBJECTIVE AND OBJECTIVE BOX
Pt seen, chart reviewed.  S/p I & D Right Arm, POD#1.  VSS.  Adequate pain control.  Resting comfortably.   Tolerating PO intake.  No N/V.    No anesthesia problems noted.

## 2017-01-05 NOTE — CONSULT NOTE ADULT - SUBJECTIVE AND OBJECTIVE BOX
[facility] [place] KAE GARCIA MD FACP   MOSES NGUYEN MD  Diplomates American Board of Internal Medicine and Infecctious Diseases  [phone]  [id number] DEBRA [id number]      HPI:  27 y/o M w/ hx heroin abuse, Presents to the ED c/o right arm abscess , that started 4 days ago, he came at  the ED on Sunday. had it draineD, and dc in po abx.. PT RETURNED WITH INCREASING SWELLING seen by  ORTHO AND I AND D DONE YESTERDAY  ASKED TO EVALUATE FROM ID STANDPOINT  Jan 2017 21:37)      PAST MEDICAL & SURGICAL HISTORY:  Abscess  Heroin abuse  No significant past surgical history      ANTIBIOTICS  piperacillin/tazobactam IVPB. 3.375Gram(s) IV Intermittent every 8 hours  vancomycin  IVPB 1500milliGRAM(s) IV Intermittent every 12 hours      Allergies    No Known Allergies    Intolerances        SOCIAL HISTORY:    FAMILY HISTORY:  No pertinent family history in first degree relatives      Vital Signs Last 24 Hrs  T(C): 36.4, Max: 37.3 (01-04 @ 17:50)  T(F): 97.5, Max: 99.1 (01-04 @ 17:50)  HR: 66 (51 - 66)  BP: 116/67 (93/66 - 135/83)  BP(mean): --  RR: 18 (15 - 19)  SpO2: 99% (94% - 99%)  Drug Dosing Weight  Height (cm): 193 (03 Jan 2017 17:18)  Weight (kg): 99.8 (03 Jan 2017 17:18)  BMI (kg/m2): 26.8 (03 Jan 2017 17:18)  BSA (m2): 2.31 (03 Jan 2017 17:18)      REVIEW OF SYSTEMS:    CONSTITUTIONAL:  As per HPI.    HEENT:  Eyes:  No diplopia or blurred vision. ENT:  No earache, sore throat or runny nose.    CARDIOVASCULAR:  No pressure, squeezing, strangling, tightness, heaviness or aching about the chest, neck, axilla or epigastrium.    RESPIRATORY:  No cough, shortness of breath, PND or orthopnea.    GASTROINTESTINAL:  No nausea, vomiting or diarrhea.    GENITOURINARY:  No dysuria, frequency or urgency.    MUSCULOSKELETAL:  As per HPI.    SKIN:  No change in skin, hair or nails.    NEUROLOGIC:  No paresthesias, fasciculations, seizures or weakness.                  PHYSICAL EXAMINATION:    GENERAL: The patient is a well-developed, well-nourished MALE IN NAD    VITAL SIGNS: T(C): 36.4, Max: 37.3 (01-04 @ 17:50)  HR: 66 (51 - 66)  BP: 116/67 (93/66 - 135/83)  RR: 18 (15 - 19)  SpO2: 99% (94% - 99%)  Wt(kg): --    HEENT: Head is normocephalic and atraumatic.  ANICTERIC  NECK: Supple. No carotid bruits.  No lymphadenopathy or thyromegaly.    LUNGS:COARSE BREATH SOUNDS    HEART: Regular rate and rhythm without murmur.    ABDOMEN: Soft, nontender, and nondistended.  Positive bowel sounds.  No hepatosplenomegaly was noted. NO REBOUND NO GUARDING    EXTREMITIES: RUE SURGICAL SITE CLEAN NO ODOR NO PUS AT PRESENT INDURATED    NEUROLOGIC: NON FOCAL      SKIN: No ulceration or induration present. NO RASH        BLOOD CULTURES       URINE CX          LABS:                        13.5   6.16  )-----------( 293      ( 05 Jan 2017 08:03 )             38.4     05 Jan 2017 08:06    140    |  101    |  13.0   ----------------------------<  164    4.1     |  25.0   |  0.70     Ca    9.6        05 Jan 2017 08:06  Mg     2.1       05 Jan 2017 08:06    TPro  7.4    /  Alb  3.8    /  TBili  0.3    /  DBili  x      /  AST  13     /  ALT  15     /  AlkPhos  49     04 Jan 2017 05:53              ASSESSMENT/PLAN  25YO MALE IVDU  HEROIN ABUSE  WITH RIGHTUPPER EXT ABSCESS S/P I AND D  AWAIT CX CONTINUE IV ABX WILL FOLLOW UP  BLOOD CX NEG SO FAR                MOSES BECKFORD MD

## 2017-01-06 LAB — VANCOMYCIN TROUGH SERPL-MCNC: 10 UG/ML — SIGNIFICANT CHANGE UP (ref 10–20)

## 2017-01-06 PROCEDURE — 99233 SBSQ HOSP IP/OBS HIGH 50: CPT

## 2017-01-06 PROCEDURE — 99223 1ST HOSP IP/OBS HIGH 75: CPT

## 2017-01-06 PROCEDURE — 99232 SBSQ HOSP IP/OBS MODERATE 35: CPT

## 2017-01-06 RX ORDER — VANCOMYCIN HCL 1 G
1250 VIAL (EA) INTRAVENOUS EVERY 8 HOURS
Qty: 0 | Refills: 0 | Status: DISCONTINUED | OUTPATIENT
Start: 2017-01-06 | End: 2017-01-06

## 2017-01-06 RX ORDER — ALPRAZOLAM 0.25 MG
0.5 TABLET ORAL ONCE
Qty: 0 | Refills: 0 | Status: DISCONTINUED | OUTPATIENT
Start: 2017-01-06 | End: 2017-01-06

## 2017-01-06 RX ORDER — VANCOMYCIN HCL 1 G
1750 VIAL (EA) INTRAVENOUS EVERY 12 HOURS
Qty: 0 | Refills: 0 | Status: DISCONTINUED | OUTPATIENT
Start: 2017-01-06 | End: 2017-01-06

## 2017-01-06 RX ORDER — METHADONE HYDROCHLORIDE 40 MG/1
10 TABLET ORAL DAILY
Qty: 0 | Refills: 0 | Status: DISCONTINUED | OUTPATIENT
Start: 2017-01-06 | End: 2017-01-07

## 2017-01-06 RX ADMIN — Medication 100 MILLIGRAM(S): at 05:34

## 2017-01-06 RX ADMIN — Medication 100 MILLIGRAM(S): at 21:40

## 2017-01-06 RX ADMIN — Medication 0.5 MILLIGRAM(S): at 22:51

## 2017-01-06 RX ADMIN — Medication 300 MILLIGRAM(S): at 05:33

## 2017-01-06 RX ADMIN — METHADONE HYDROCHLORIDE 10 MILLIGRAM(S): 40 TABLET ORAL at 05:34

## 2017-01-06 RX ADMIN — Medication 300 MILLIGRAM(S): at 13:50

## 2017-01-06 RX ADMIN — PIPERACILLIN AND TAZOBACTAM 25 GRAM(S): 4; .5 INJECTION, POWDER, LYOPHILIZED, FOR SOLUTION INTRAVENOUS at 05:33

## 2017-01-06 RX ADMIN — METHADONE HYDROCHLORIDE 10 MILLIGRAM(S): 40 TABLET ORAL at 13:28

## 2017-01-06 RX ADMIN — Medication 300 MILLIGRAM(S): at 21:40

## 2017-01-06 RX ADMIN — FAMOTIDINE 20 MILLIGRAM(S): 10 INJECTION INTRAVENOUS at 17:48

## 2017-01-06 RX ADMIN — Medication 100 MILLIGRAM(S): at 13:28

## 2017-01-06 RX ADMIN — ENOXAPARIN SODIUM 40 MILLIGRAM(S): 100 INJECTION SUBCUTANEOUS at 05:34

## 2017-01-06 RX ADMIN — SODIUM CHLORIDE 100 MILLILITER(S): 9 INJECTION, SOLUTION INTRAVENOUS at 10:53

## 2017-01-06 RX ADMIN — FAMOTIDINE 20 MILLIGRAM(S): 10 INJECTION INTRAVENOUS at 05:34

## 2017-01-06 NOTE — PROGRESS NOTE ADULT - SUBJECTIVE AND OBJECTIVE BOX
Patient seen and examined at bedside. On one-to-one. Comfortable in bed. Pain controlled. Denies fever/chills, SOB/chest pain, abdominal pain, numbness/tingling. No complaints.    Vital Signs Last 24 Hrs  T(C): 37, Max: 37 (01-05 @ 23:20)  T(F): 98.6, Max: 98.6 (01-05 @ 23:20)  HR: 52 (52 - 55)  BP: 121/67 (107/62 - 121/67)  BP(mean): --  RR: 18 (18 - 18)  SpO2: 96% (96% - 97%)    RUE: Dressing C/D/I. Sutures C/D/I. + small amount of slow, serosanguinous oozing from middle of incision. no erythema. no fluctuance. + good ROM elbow without pain. + wrist flexion/extension. + thumb abduction. + ROM phalanges. Compartments soft. Radial pulse 2+. Sensation in tact to light touch                          13.5   6.16  )-----------( 293      ( 05 Jan 2017 08:03 )             38.4     Culture - Abscess with Gram Stain (01.04.17 @ 21:52)    Gram Stain:   Rare White blood cells  No organisms seen    Specimen Source: .Abscess right arm abscess #2 (swabs)    Culture Results:   No growth at 1 day.  Culture in progress    Culture - Abscess with Gram Stain (01.04.17 @ 21:50)    Gram Stain:   Rare White blood cells  No organisms seen    Specimen Source: .Abscess right arm abscess #1 (swabs)    Culture Results:   No growth at 1 day.  Culture in progress    A/P: 26 y.o M s/p right arm I&D POD#2  - dressing changed  - DVTP  - IV abx as per ID  - F/U OR cultures  - continue care primary team

## 2017-01-06 NOTE — PROGRESS NOTE ADULT - SUBJECTIVE AND OBJECTIVE BOX
CHIEF COMPLAINT/INTERVAL HISTORY:  Pt. seen and evaluated for abscess and cellulitis of the RUE.  Tolerating IV antibiotic.      REVIEW OF SYSTEMS:  No fever, CP, or SOB.    Vital Signs Last 24 Hrs  T(C): 36.8, Max: 37 (01-05 @ 23:20)  T(F): 98.3, Max: 98.6 (01-05 @ 23:20)  HR: 49 (49 - 55)  BP: 112/65 (107/62 - 121/67)  BP(mean): --  RR: 18 (18 - 18)  SpO2: 97% (96% - 97%)    PHYSICAL EXAM:  GENERAL: NAD  HEENT: EOMI, hearing normal, conjunctiva and sclera clear  Chest: CTA bilaterally, no wheezing  CV: S1S2, RRR,   GI: soft, +BS, NT/ND  Musculoskeletal: Dressing over RUE  Psychiatric: affect nL, mood nL  Skin: warm and dry    LABS:                        13.5   6.16  )-----------( 293      ( 05 Jan 2017 08:03 )             38.4     05 Jan 2017 08:06    140    |  101    |  13.0   ----------------------------<  164    4.1     |  25.0   |  0.70     Ca    9.6        05 Jan 2017 08:06  Mg     2.1       05 Jan 2017 08:06      Assessment and Plan:  1) Cellulitis and abscess of the RUE:  s/p I&D.  continue Zosyn and Vancomycin.  Pain control PRN.  Blood Cx and abscess cx so far negative.  ID and Ortho f/u.   2) Heroine abuse: Change methadone to 10mg PO daily  3) Recent loss of brother/?suicidal and homocidal ideations:  1:1 observation.  Awaiting Psychiatry consult.   4) VTE ppx: Lovenox 40mg SQ daily

## 2017-01-06 NOTE — PROGRESS NOTE ADULT - SUBJECTIVE AND OBJECTIVE BOX
[facility] [place] KAE GARCIA MD FACP   MOSES NGUYEN MD  Diplomates American Board of Internal Medicine and Infectious Diseases      CARLYN NAGY  MRN-[id number]  26y    INTERVAL HPI/OVERNIGHT EVENTS:  PRIOR CLINDA WITH ALL BLOOD AND OR C.S NEG    Vital Signs Last 24 Hrs  T(C): 36.8, Max: 37 (01-05 @ 23:20)  T(F): 98.3, Max: 98.6 (01-05 @ 23:20)  HR: 49 (49 - 55)  BP: 112/65 (107/62 - 121/67)  BP(mean): --  RR: 18 (18 - 18)  SpO2: 97% (96% - 97%)    ANTIBIOTICS  clindamycin   Capsule 300milliGRAM(s) Oral three times a day      Allergies    No Known Allergies    Intolerances        REVIEW OF SYSTEMS:    PHYSICAL EXAM:  Vital Signs Last 24 Hrs  T(C): 36.8, Max: 37 (01-05 @ 23:20)  T(F): 98.3, Max: 98.6 (01-05 @ 23:20)  HR: 49 (49 - 55)  BP: 112/65 (107/62 - 121/67)  BP(mean): --  RR: 18 (18 - 18)  SpO2: 97% (96% - 97%)      GEN: NAD, pleasant  HEENT: normocephalic and atraumatic. EOMI. RONAK. Moist mucosa. Clear Posterior pharynx.  NECK: Supple. No carotid bruits.  No lymphadenopathy or thyromegaly.  LUNGS: Clear to auscultation.  HEART: Regular rate and rhythm without murmur.  ABDOMEN: Soft, nontender, and nondistended.  Positive bowel sounds.  No hepatosplenomegaly was noted.  EXTREMITIES: Without any cyanosis, clubbing, rash, lesions or edema.DRESSING CHANGED - NO CELLULITIS. WND CLEAN - MIN SEROPURULENT DRAINAGE  NEUROLOGIC: Cranial nerves II through XII are grossly intact.  MUSCULOSKELETAL:  SKIN: No ulceration or induration present    LABS:                        13.5   6.16  )-----------( 293      ( 05 Jan 2017 08:03 )             38.4       05 Jan 2017 08:06    140    |  101    |  13.0   ----------------------------<  164    4.1     |  25.0   |  0.70     Ca    9.6        05 Jan 2017 08:06  Mg     2.1       05 Jan 2017 08:06          01-05 @ 08:03  hct 38.4 % hgb 13.5 g/dL    01-05 @ 08:03  plat 293 K/uL wbc 6.16 K/uL    01-04 @ 05:53  hct 37.5 % hgb 13.2 g/dL    01-04 @ 05:53  plat 256 K/uL wbc 3.57 K/uL    01-03 @ 17:34  hct 37.3 % hgb 12.9 g/dL    01-03 @ 17:34  plat 262 K/uL wbc 6.39 K/uL      01-05-17  creat 0.70 mg/dL gfr 149 mL/min/1.73M2 bun 13.0 mg/dL k 4.1 mmol/L  01-04-17  creat 0.68 mg/dL gfr 151 mL/min/1.73M2 bun 7.0 mg/dL k 4.3 mmol/L  01-03-17  creat 0.73 mg/dL gfr 146 mL/min/1.73M2 bun 8.0 mg/dL k 4.6 mmol/L      MICROBIOLOGY:  Culture Results:   No growth at 1 day.  Culture in progress (01-04 @ 21:52)  Culture Results:   No growth at 1 day.  Culture in progress (01-04 @ 21:50)  Culture Results:   No growth at 48 hours (01-03 @ 19:08)  Culture Results:   No growth at 48 hours (01-03 @ 19:07)        RADIOLOGY & ADDITIONAL STUDIES:      ASSESSMENT AND PLAN:  S/P DRAINAGE ABSCESS  ALL C/S NEG  IF CLEARED BY SURGERY   CHANGE TO PO ABS  WILL NO LONGER SEE

## 2017-01-07 VITALS
HEART RATE: 58 BPM | RESPIRATION RATE: 18 BRPM | SYSTOLIC BLOOD PRESSURE: 112 MMHG | DIASTOLIC BLOOD PRESSURE: 68 MMHG | TEMPERATURE: 98 F | OXYGEN SATURATION: 100 %

## 2017-01-07 LAB
ANION GAP SERPL CALC-SCNC: 11 MMOL/L — SIGNIFICANT CHANGE UP (ref 5–17)
BUN SERPL-MCNC: 12 MG/DL — SIGNIFICANT CHANGE UP (ref 8–20)
CALCIUM SERPL-MCNC: 9.1 MG/DL — SIGNIFICANT CHANGE UP (ref 8.6–10.2)
CHLORIDE SERPL-SCNC: 105 MMOL/L — SIGNIFICANT CHANGE UP (ref 98–107)
CO2 SERPL-SCNC: 29 MMOL/L — SIGNIFICANT CHANGE UP (ref 22–29)
CREAT SERPL-MCNC: 0.77 MG/DL — SIGNIFICANT CHANGE UP (ref 0.5–1.3)
GLUCOSE SERPL-MCNC: 94 MG/DL — SIGNIFICANT CHANGE UP (ref 70–115)
POTASSIUM SERPL-MCNC: 4.2 MMOL/L — SIGNIFICANT CHANGE UP (ref 3.5–5.3)
POTASSIUM SERPL-SCNC: 4.2 MMOL/L — SIGNIFICANT CHANGE UP (ref 3.5–5.3)
SODIUM SERPL-SCNC: 145 MMOL/L — SIGNIFICANT CHANGE UP (ref 135–145)

## 2017-01-07 PROCEDURE — 85027 COMPLETE CBC AUTOMATED: CPT

## 2017-01-07 PROCEDURE — 80202 ASSAY OF VANCOMYCIN: CPT

## 2017-01-07 PROCEDURE — 99284 EMERGENCY DEPT VISIT MOD MDM: CPT | Mod: 25

## 2017-01-07 PROCEDURE — 80048 BASIC METABOLIC PNL TOTAL CA: CPT

## 2017-01-07 PROCEDURE — 83735 ASSAY OF MAGNESIUM: CPT

## 2017-01-07 PROCEDURE — 87186 SC STD MICRODIL/AGAR DIL: CPT

## 2017-01-07 PROCEDURE — 36415 COLL VENOUS BLD VENIPUNCTURE: CPT

## 2017-01-07 PROCEDURE — 96374 THER/PROPH/DIAG INJ IV PUSH: CPT | Mod: XU

## 2017-01-07 PROCEDURE — 99239 HOSP IP/OBS DSCHRG MGMT >30: CPT

## 2017-01-07 PROCEDURE — 87040 BLOOD CULTURE FOR BACTERIA: CPT

## 2017-01-07 PROCEDURE — 80053 COMPREHEN METABOLIC PANEL: CPT

## 2017-01-07 PROCEDURE — 87205 SMEAR GRAM STAIN: CPT

## 2017-01-07 PROCEDURE — 73201 CT UPPER EXTREMITY W/DYE: CPT

## 2017-01-07 PROCEDURE — 73090 X-RAY EXAM OF FOREARM: CPT

## 2017-01-07 PROCEDURE — 73080 X-RAY EXAM OF ELBOW: CPT

## 2017-01-07 PROCEDURE — 10061 I&D ABSCESS COMP/MULTIPLE: CPT

## 2017-01-07 PROCEDURE — 87070 CULTURE OTHR SPECIMN AEROBIC: CPT

## 2017-01-07 PROCEDURE — 87075 CULTR BACTERIA EXCEPT BLOOD: CPT

## 2017-01-07 RX ORDER — METHADONE HYDROCHLORIDE 40 MG/1
1 TABLET ORAL
Qty: 3 | Refills: 0 | OUTPATIENT
Start: 2017-01-07 | End: 2017-01-10

## 2017-01-07 RX ORDER — LACTOBACILLUS ACIDOPHILUS 100MM CELL
1 CAPSULE ORAL
Qty: 0 | Refills: 0 | Status: DISCONTINUED | OUTPATIENT
Start: 2017-01-07 | End: 2017-01-07

## 2017-01-07 RX ORDER — LACTOBACILLUS ACIDOPHILUS 100MM CELL
1 CAPSULE ORAL
Qty: 14 | Refills: 0 | OUTPATIENT
Start: 2017-01-07 | End: 2017-01-14

## 2017-01-07 RX ADMIN — Medication 300 MILLIGRAM(S): at 06:46

## 2017-01-07 RX ADMIN — Medication 300 MILLIGRAM(S): at 13:10

## 2017-01-07 RX ADMIN — METHADONE HYDROCHLORIDE 10 MILLIGRAM(S): 40 TABLET ORAL at 11:24

## 2017-01-07 RX ADMIN — Medication 100 MILLIGRAM(S): at 06:46

## 2017-01-07 RX ADMIN — FAMOTIDINE 20 MILLIGRAM(S): 10 INJECTION INTRAVENOUS at 06:46

## 2017-01-07 RX ADMIN — Medication 100 MILLIGRAM(S): at 13:10

## 2017-01-07 NOTE — DISCHARGE NOTE ADULT - MEDICATION SUMMARY - MEDICATIONS TO TAKE
I will START or STAY ON the medications listed below when I get home from the hospital:    methadone 5 mg oral tablet  -- 1 tab(s) by mouth once a day MDD:1 tab daily  -- Caution federal law prohibits the transfer of this drug to any person other  than the person for whom it was prescribed.  May cause drowsiness.  Alcohol may intensify this effect.  Use care when operating dangerous machinery.  This prescription cannot be refilled.  Using more of this medication than prescribed may cause serious breathing problems.    -- Indication: For Heroine    clindamycin 300 mg oral capsule  -- 1 cap(s) by mouth 3 times a day  -- Indication: For Cellulitis    lactobacillus acidophilus oral capsule  -- 1 tab(s) by mouth 2 times a day  -- Indication: For Prophylaxis

## 2017-01-07 NOTE — DISCHARGE NOTE ADULT - PATIENT PORTAL LINK FT
“You can access the Follow[employer] Patient Portal, offered by [facility], by registering with the following website: [email or web address]”

## 2017-01-07 NOTE — DISCHARGE NOTE ADULT - PLAN OF CARE
. Complete antibiotics as prescribed. Follow up with Orthopedics for further management. Follow up with your primary care physician for further management.

## 2017-01-07 NOTE — DISCHARGE NOTE ADULT - CARE PROVIDERS DIRECT ADDRESSES
,DirectAddress_Unknown,[email or web address],DirectAddress_Unknown

## 2017-01-07 NOTE — DISCHARGE NOTE ADULT - CARE PLAN
Principal Discharge DX:	Abscess  Goal:	.  Instructions for follow-up, activity and diet:	Complete antibiotics as prescribed. Follow up with Orthopedics for further management.  Secondary Diagnosis:	Heroin abuse  Instructions for follow-up, activity and diet:	Follow up with your primary care physician for further management.

## 2017-01-07 NOTE — DISCHARGE NOTE ADULT - MEDICATION SUMMARY - MEDICATIONS TO CHANGE
I will SWITCH the dose or number of times a day I take the medications listed below when I get home from the hospital:    clindamycin 300 mg oral capsule  -- 1 by mouth every 6 hours  -- Finish all this medication unless otherwise directed by prescriber.  Medication should be taken with plenty of water.

## 2017-01-07 NOTE — DISCHARGE NOTE ADULT - CARE PROVIDER_API CALL
Michael Mcclure (MD), Internal Medicine  [address] [place] [address]  Phone: [phone]  Fax: [phone]    Joe Arzate (), Orthopaedic Surgery  [address] [place] [address]  Phone: [phone]  Fax: [phone]

## 2017-01-07 NOTE — DISCHARGE NOTE ADULT - HOSPITAL COURSE
26M presented with a four day history of right arm infection. The patient had previously been evaluated in the emergency department and had drainage of an abscess. On presentation, WBC(6.39), H/H12.9/37.3). Xray of the right elbow noted bone deformity and radial head and neck suggestive of old trauma. CT of the right arm(1/3) noted a small abscess at the level of the antecubital fossa, with an intramuscular component in the superficial portions of the distal biceps brachii muscle, just proximal to the myotendinous junction. The patient was seen by Orthopedics in consultation and thought to require surgical intervention. The patient underwent surgery with incision and drainage of the abscess. The patient was seen by Infectious Disease in consultation and continued on antibiotics. Blood cultures were without growth. The surgical cultures were also without growth. The patient remained afebrile and was started on oral antibiotic therapy. The patient had expressed suicidal and homicidal ideation due to the recent loss of his brother. He was seen by Psychiatry in consultation and was not found to have any suicidal or homicidal thoughts. He was not thought to be a threat to himself or others and did not need inpatient psychiatric treatment. The remainder of the hospital course was uneventful. The patient was instructed to abstain from further illicit drug use and to follow up with his primary care physician and Orthopedics for further management.

## 2017-01-08 LAB
-  AMPICILLIN/SULBACTAM: SIGNIFICANT CHANGE UP
-  CEFAZOLIN: SIGNIFICANT CHANGE UP
-  CIPROFLOXACIN: SIGNIFICANT CHANGE UP
-  CLINDAMYCIN: SIGNIFICANT CHANGE UP
-  ERYTHROMYCIN: SIGNIFICANT CHANGE UP
-  GENTAMICIN: SIGNIFICANT CHANGE UP
-  LEVOFLOXACIN: SIGNIFICANT CHANGE UP
-  MOXIFLOXACIN(AEROBIC): SIGNIFICANT CHANGE UP
-  OXACILLIN: SIGNIFICANT CHANGE UP
-  PENICILLIN: SIGNIFICANT CHANGE UP
-  RIFAMPIN: SIGNIFICANT CHANGE UP
-  TETRACYCLINE: SIGNIFICANT CHANGE UP
-  TRIMETHOPRIM/SULFAMETHOXAZOLE: SIGNIFICANT CHANGE UP
-  VANCOMYCIN: SIGNIFICANT CHANGE UP
CULTURE RESULTS: SIGNIFICANT CHANGE UP
CULTURE RESULTS: SIGNIFICANT CHANGE UP
METHOD TYPE: SIGNIFICANT CHANGE UP
SPECIMEN SOURCE: SIGNIFICANT CHANGE UP
SPECIMEN SOURCE: SIGNIFICANT CHANGE UP

## 2017-01-10 LAB
CULTURE RESULTS: SIGNIFICANT CHANGE UP
ORGANISM # SPEC MICROSCOPIC CNT: SIGNIFICANT CHANGE UP
SPECIMEN SOURCE: SIGNIFICANT CHANGE UP
SPECIMEN SOURCE: SIGNIFICANT CHANGE UP

## 2017-01-25 ENCOUNTER — EMERGENCY (EMERGENCY)
Facility: HOSPITAL | Age: 27
LOS: 1 days | Discharge: HOME | End: 2017-01-25
Attending: EMERGENCY MEDICINE
Payer: MEDICAID

## 2017-01-25 VITALS
OXYGEN SATURATION: 100 % | DIASTOLIC BLOOD PRESSURE: 77 MMHG | SYSTOLIC BLOOD PRESSURE: 124 MMHG | WEIGHT: 220.02 LBS | TEMPERATURE: 98 F | HEIGHT: 76 IN | RESPIRATION RATE: 20 BRPM | HEART RATE: 83 BPM

## 2017-01-25 VITALS
TEMPERATURE: 98 F | OXYGEN SATURATION: 99 % | HEART RATE: 69 BPM | DIASTOLIC BLOOD PRESSURE: 76 MMHG | SYSTOLIC BLOOD PRESSURE: 118 MMHG | RESPIRATION RATE: 19 BRPM

## 2017-01-25 DIAGNOSIS — L03.114 CELLULITIS OF LEFT UPPER LIMB: ICD-10-CM

## 2017-01-25 DIAGNOSIS — M79.602 PAIN IN LEFT ARM: ICD-10-CM

## 2017-01-25 LAB
ALBUMIN SERPL ELPH-MCNC: 4.4 G/DL — SIGNIFICANT CHANGE UP (ref 3.3–5.2)
ALP SERPL-CCNC: 50 U/L — SIGNIFICANT CHANGE UP (ref 40–120)
ALT FLD-CCNC: 27 U/L — SIGNIFICANT CHANGE UP
ANION GAP SERPL CALC-SCNC: 15 MMOL/L — SIGNIFICANT CHANGE UP (ref 5–17)
AST SERPL-CCNC: 21 U/L — SIGNIFICANT CHANGE UP
BASOPHILS # BLD AUTO: 0 K/UL — SIGNIFICANT CHANGE UP (ref 0–0.2)
BASOPHILS NFR BLD AUTO: 0.2 % — SIGNIFICANT CHANGE UP (ref 0–2)
BILIRUB SERPL-MCNC: 0.5 MG/DL — SIGNIFICANT CHANGE UP (ref 0.4–2)
BUN SERPL-MCNC: 9 MG/DL — SIGNIFICANT CHANGE UP (ref 8–20)
CALCIUM SERPL-MCNC: 10.1 MG/DL — SIGNIFICANT CHANGE UP (ref 8.6–10.2)
CHLORIDE SERPL-SCNC: 100 MMOL/L — SIGNIFICANT CHANGE UP (ref 98–107)
CK SERPL-CCNC: 143 U/L — SIGNIFICANT CHANGE UP (ref 30–200)
CO2 SERPL-SCNC: 25 MMOL/L — SIGNIFICANT CHANGE UP (ref 22–29)
CREAT SERPL-MCNC: 0.69 MG/DL — SIGNIFICANT CHANGE UP (ref 0.5–1.3)
EOSINOPHIL # BLD AUTO: 0 K/UL — SIGNIFICANT CHANGE UP (ref 0–0.5)
EOSINOPHIL NFR BLD AUTO: 0.3 % — SIGNIFICANT CHANGE UP (ref 0–5)
GLUCOSE SERPL-MCNC: 100 MG/DL — SIGNIFICANT CHANGE UP (ref 70–115)
HCT VFR BLD CALC: 41.7 % — LOW (ref 42–52)
HGB BLD-MCNC: 14.4 G/DL — SIGNIFICANT CHANGE UP (ref 14–18)
LACTATE BLDV-MCNC: 1.6 MMOL/L — SIGNIFICANT CHANGE UP (ref 0.7–2)
LYMPHOCYTES # BLD AUTO: 1.7 K/UL — SIGNIFICANT CHANGE UP (ref 1–4.8)
LYMPHOCYTES # BLD AUTO: 14.5 % — LOW (ref 20–55)
MCHC RBC-ENTMCNC: 29.8 PG — SIGNIFICANT CHANGE UP (ref 27–31)
MCHC RBC-ENTMCNC: 34.5 G/DL — SIGNIFICANT CHANGE UP (ref 32–36)
MCV RBC AUTO: 86.3 FL — SIGNIFICANT CHANGE UP (ref 80–94)
MONOCYTES # BLD AUTO: 1 K/UL — HIGH (ref 0–0.8)
MONOCYTES NFR BLD AUTO: 8.2 % — SIGNIFICANT CHANGE UP (ref 3–10)
NEUTROPHILS # BLD AUTO: 9.2 K/UL — HIGH (ref 1.8–8)
NEUTROPHILS NFR BLD AUTO: 76.5 % — HIGH (ref 37–73)
PLATELET # BLD AUTO: 230 K/UL — SIGNIFICANT CHANGE UP (ref 150–400)
POTASSIUM SERPL-MCNC: 4.5 MMOL/L — SIGNIFICANT CHANGE UP (ref 3.5–5.3)
POTASSIUM SERPL-SCNC: 4.5 MMOL/L — SIGNIFICANT CHANGE UP (ref 3.5–5.3)
PROT SERPL-MCNC: 8.5 G/DL — SIGNIFICANT CHANGE UP (ref 6.6–8.7)
RBC # BLD: 4.83 M/UL — SIGNIFICANT CHANGE UP (ref 4.6–6.2)
RBC # FLD: 14.1 % — SIGNIFICANT CHANGE UP (ref 11–15.6)
SODIUM SERPL-SCNC: 140 MMOL/L — SIGNIFICANT CHANGE UP (ref 135–145)
WBC # BLD: 11.96 K/UL — HIGH (ref 4.8–10.8)
WBC # FLD AUTO: 11.96 K/UL — HIGH (ref 4.8–10.8)

## 2017-01-25 PROCEDURE — 82550 ASSAY OF CK (CPK): CPT

## 2017-01-25 PROCEDURE — 99284 EMERGENCY DEPT VISIT MOD MDM: CPT | Mod: 25

## 2017-01-25 PROCEDURE — 80053 COMPREHEN METABOLIC PANEL: CPT

## 2017-01-25 PROCEDURE — 73090 X-RAY EXAM OF FOREARM: CPT | Mod: 26,LT

## 2017-01-25 PROCEDURE — 99284 EMERGENCY DEPT VISIT MOD MDM: CPT

## 2017-01-25 PROCEDURE — 96374 THER/PROPH/DIAG INJ IV PUSH: CPT

## 2017-01-25 PROCEDURE — 85027 COMPLETE CBC AUTOMATED: CPT

## 2017-01-25 PROCEDURE — 93971 EXTREMITY STUDY: CPT | Mod: 26,LT

## 2017-01-25 PROCEDURE — 83605 ASSAY OF LACTIC ACID: CPT

## 2017-01-25 PROCEDURE — 73090 X-RAY EXAM OF FOREARM: CPT

## 2017-01-25 PROCEDURE — 93971 EXTREMITY STUDY: CPT

## 2017-01-25 RX ORDER — SODIUM CHLORIDE 9 MG/ML
3 INJECTION INTRAMUSCULAR; INTRAVENOUS; SUBCUTANEOUS EVERY 8 HOURS
Qty: 0 | Refills: 0 | Status: DISCONTINUED | OUTPATIENT
Start: 2017-01-25 | End: 2017-01-29

## 2017-01-25 RX ORDER — IBUPROFEN 200 MG
600 TABLET ORAL ONCE
Qty: 0 | Refills: 0 | Status: COMPLETED | OUTPATIENT
Start: 2017-01-25 | End: 2017-01-25

## 2017-01-25 RX ADMIN — Medication 100 MILLIGRAM(S): at 18:58

## 2017-01-25 RX ADMIN — Medication 600 MILLIGRAM(S): at 20:09

## 2017-01-25 NOTE — ED ADULT TRIAGE NOTE - CHIEF COMPLAINT QUOTE
pt states he had what he thought was an abscess to the back of his right arm and tried to pop it.  pt states his arm is now swollen and painful. pt denies fever

## 2017-01-25 NOTE — ED STATDOCS - NS ED MD SCRIBE ATTENDING SCRIBE SECTIONS
DISPOSITION/HISTORY OF PRESENT ILLNESS/HIV/PAST MEDICAL/SURGICAL/SOCIAL HISTORY/VITAL SIGNS( Pullset)/REVIEW OF SYSTEMS/PHYSICAL EXAM

## 2017-01-25 NOTE — ED STATDOCS - OBJECTIVE STATEMENT
27 y/o M with a hx of IVDA presents to the ED c/o left arm swelling and pain due to an abscess. Pt states he thought he had an abscess to his left arm and he tried to pop it and now it is swollen and painful. Pt thinks there may be a needle stuck in there. Denies fever or chills. 25 y/o M with a hx of IVDA presents to the ED c/o left arm swelling and pain due to an abscess x4 days. Pt states he thought he had an abscess to his left arm and he tried to pop it and now it is swollen and painful. Pt thinks there may be a needle stuck in his left arm. Denies fever or chills, nausea or vomiting.

## 2017-01-25 NOTE — ED ADULT NURSE NOTE - OBJECTIVE STATEMENT
pt with RUE swollen RUE right elbow RFA  'I used yesterday but I used my left arm"  'I just had an abscess on my left arm and was just through this shit'  'and I am back'

## 2017-01-25 NOTE — ED STATDOCS - PROGRESS NOTE DETAILS
Pt seen and evaluated. 25 yo M h/o IVDA  c/o pain and swelling to left arm. Pt states he injected twice to arm and both areas now swollen and painful. Pt denies any fevers/chills. No N/V. + sts noted. Mild erythema. No fluctuance. No visible abscess. No streaking. Lungs CTA B/L. CVS S1S2 No Murmur. FROM b/l UE . XR neg for fb. labs/US/Abx and reeval labs,XR, US noted. No fluctuance to areas- warm soaks, abx and f/u with PMD. Pt instructed to return if abscess come to visible head

## 2017-01-25 NOTE — ED ADULT NURSE NOTE - CAS EDN DISCHARGE INTERVENTIONS
[facility]  Administration of Anesthesia  Malka MILLIGAN agree to be cared for by a physician anesthesiologist alone and/or with a nurse anesthetist, who is specially trained to monitor me and give me medicine to put me to sleep or keep me comfortable during my procedure    I understand that my anesthesiologist and/or anesthetist is not an employee or agent of [facility] or [employer]. He or she works for [facility]    As the patient asking for anesthesia services, I agree to:  Allow the anesthesiologist (anesthesia doctor) to give me medicine and do additional procedures as necessary. Some examples are: Starting or using an “IV” to give me medicine, fluids or blood during my procedure, and having a breathing tube placed to help me breathe when I’m asleep (intubation). In the event that my heart stops working properly, I understand that my anesthesiologist will make every effort to sustain my life, unless otherwise directed by [facility] Do Not Resuscitate documents.  Tell my anesthesia doctor before my procedure:  If I am pregnant.  The last time that I ate or drank.  iii. All of the medicines I take (including prescriptions, herbal supplements, and pills I can buy without a prescription (including street drugs/illegal medications). Failure to inform my anesthesiologist about these medicines may increase my risk of anesthetic complications.  iv.If I am allergic to anything or have had a reaction to anesthesia before.  I understand how the anesthesia medicine will help me (benefits).  I understand that with any type of anesthesia medicine there are risks:  The most common risks are: nausea, vomiting, sore throat, muscle soreness, damage to my eyes, mouth, or teeth (from breathing tube placement).  Rare risks include: remembering what happened during my procedure, allergic reactions to medications, injury to my airway, heart, lungs, vision, nerves, or  muscles and in extremely rare instances death.  My doctor has explained to me other choices available to me for my care (alternatives).  Pregnant Patients (“epidural”):  I understand that the risks of having an epidural (medicine given into my back to help control pain during labor), include itching, low blood pressure, difficulty urinating, headache or slowing of the baby’s heart. Very rare risks include infection, bleeding, seizure, irregular heart rhythms and nerve injury.  Regional Anesthesia (“spinal”, “epidural”, & “nerve blocks”):  I understand that rare but potential complications include headache, bleeding, infection, seizure, irregular heart rhythms, and nerve injury.    _____________________________________________________________________________  Patient (or Representative) Signature/Relationship to Patient  Date   Time    _____________________________________________________________________________   Name (if used)    Language/Organization   Time    _____________________________________________________________________________  Nurse Anesthetist Signature     Date   Time  _____________________________________________________________________________  Anesthesiologist Signature     Date   Time  I have discussed the procedure and information above with the patient (or patient’s representative) and answered their questions. The patient or their representative has agreed to have anesthesia services.    _____________________________________________________________________________  Witness        Date   Time  I have verified that the signature is that of the patient or patient’s representative, and that it was signed before the procedure  Patient Name: Malka Chery     : 1995                 Printed: 2024 at 5:35 AM    Medical Record #: [id number]                                            Page 1 of 1  ----------ANESTHESIA CONSENT----------     IV discontinued, cath removed intact

## 2017-01-25 NOTE — ED STATDOCS - ATTENDING CONTRIBUTION TO CARE
I, Peter Kennedy, performed the initial face to face bedside interview with this patient regarding history of present illness, review of symptoms and relevant past medical, social and family history.  I completed an independent physical examination.  I was the initial provider who evaluated this patient. I have signed out the follow up of any pending tests (i.e. labs, radiological studies) to the ACP.  I have communicated the patient’s plan of care and disposition with the ACP.  The history, relevant review of systems, past medical and surgical history, medical decision making, and physical examination was documented by the scribe in my presence and I attest to the accuracy of the documentation.

## 2017-01-27 NOTE — ED ADULT TRIAGE NOTE - WEIGHT IN LBS
Dr. Everett spoke to pt's mother on 1/26/17 regarding concerns. All questions answered per mother.   
220

## 2017-01-28 ENCOUNTER — INPATIENT (INPATIENT)
Facility: HOSPITAL | Age: 27
LOS: 6 days | Discharge: HOME HEALTH | DRG: 580 | End: 2017-02-04
Attending: SURGERY | Admitting: FAMILY MEDICINE
Payer: MEDICAID

## 2017-01-28 VITALS
HEIGHT: 76 IN | RESPIRATION RATE: 18 BRPM | HEART RATE: 96 BPM | SYSTOLIC BLOOD PRESSURE: 132 MMHG | WEIGHT: 220.02 LBS | OXYGEN SATURATION: 99 % | DIASTOLIC BLOOD PRESSURE: 74 MMHG | TEMPERATURE: 99 F

## 2017-01-28 LAB
ALBUMIN SERPL ELPH-MCNC: 3.9 G/DL — SIGNIFICANT CHANGE UP (ref 3.3–5.2)
ALP SERPL-CCNC: 46 U/L — SIGNIFICANT CHANGE UP (ref 40–120)
ALT FLD-CCNC: 17 U/L — SIGNIFICANT CHANGE UP
ANION GAP SERPL CALC-SCNC: 15 MMOL/L — SIGNIFICANT CHANGE UP (ref 5–17)
AST SERPL-CCNC: 13 U/L — SIGNIFICANT CHANGE UP
BASOPHILS NFR BLD AUTO: 3 % — HIGH (ref 0–2)
BILIRUB SERPL-MCNC: 0.6 MG/DL — SIGNIFICANT CHANGE UP (ref 0.4–2)
BUN SERPL-MCNC: 9 MG/DL — SIGNIFICANT CHANGE UP (ref 8–20)
CALCIUM SERPL-MCNC: 9.2 MG/DL — SIGNIFICANT CHANGE UP (ref 8.6–10.2)
CHLORIDE SERPL-SCNC: 95 MMOL/L — LOW (ref 98–107)
CO2 SERPL-SCNC: 24 MMOL/L — SIGNIFICANT CHANGE UP (ref 22–29)
CREAT SERPL-MCNC: 0.83 MG/DL — SIGNIFICANT CHANGE UP (ref 0.5–1.3)
EOSINOPHIL NFR BLD AUTO: 1 % — SIGNIFICANT CHANGE UP (ref 0–6)
GLUCOSE SERPL-MCNC: 136 MG/DL — HIGH (ref 70–115)
HCT VFR BLD CALC: 36.5 % — LOW (ref 42–52)
HGB BLD-MCNC: 12.9 G/DL — LOW (ref 14–18)
INR BLD: 1.45 RATIO — HIGH (ref 0.88–1.16)
LYMPHOCYTES # BLD AUTO: 12 % — LOW (ref 20–55)
MCHC RBC-ENTMCNC: 29.9 PG — SIGNIFICANT CHANGE UP (ref 27–31)
MCHC RBC-ENTMCNC: 35.3 G/DL — SIGNIFICANT CHANGE UP (ref 32–36)
MCV RBC AUTO: 84.5 FL — SIGNIFICANT CHANGE UP (ref 80–94)
MONOCYTES NFR BLD AUTO: 10 % — SIGNIFICANT CHANGE UP (ref 3–10)
NEUTROPHILS NFR BLD AUTO: 70 % — SIGNIFICANT CHANGE UP (ref 37–73)
PLATELET # BLD AUTO: 254 K/UL — SIGNIFICANT CHANGE UP (ref 150–400)
POTASSIUM SERPL-MCNC: 4 MMOL/L — SIGNIFICANT CHANGE UP (ref 3.5–5.3)
POTASSIUM SERPL-SCNC: 4 MMOL/L — SIGNIFICANT CHANGE UP (ref 3.5–5.3)
PROT SERPL-MCNC: 7.6 G/DL — SIGNIFICANT CHANGE UP (ref 6.6–8.7)
PROTHROM AB SERPL-ACNC: 16 SEC — HIGH (ref 10–13.1)
RBC # BLD: 4.32 M/UL — LOW (ref 4.6–6.2)
RBC # FLD: 13.5 % — SIGNIFICANT CHANGE UP (ref 11–15.6)
SODIUM SERPL-SCNC: 134 MMOL/L — LOW (ref 135–145)
WBC # BLD: 15.24 K/UL — HIGH (ref 4.8–10.8)
WBC # FLD AUTO: 15.24 K/UL — HIGH (ref 4.8–10.8)

## 2017-01-28 RX ORDER — SODIUM CHLORIDE 9 MG/ML
3 INJECTION INTRAMUSCULAR; INTRAVENOUS; SUBCUTANEOUS EVERY 8 HOURS
Qty: 0 | Refills: 0 | Status: DISCONTINUED | OUTPATIENT
Start: 2017-01-28 | End: 2017-01-29

## 2017-01-28 RX ORDER — SODIUM CHLORIDE 9 MG/ML
2000 INJECTION INTRAMUSCULAR; INTRAVENOUS; SUBCUTANEOUS ONCE
Qty: 0 | Refills: 0 | Status: COMPLETED | OUTPATIENT
Start: 2017-01-28 | End: 2017-01-28

## 2017-01-28 RX ORDER — VANCOMYCIN HCL 1 G
1000 VIAL (EA) INTRAVENOUS ONCE
Qty: 0 | Refills: 0 | Status: COMPLETED | OUTPATIENT
Start: 2017-01-28 | End: 2017-01-28

## 2017-01-28 RX ORDER — KETOROLAC TROMETHAMINE 30 MG/ML
30 SYRINGE (ML) INJECTION ONCE
Qty: 0 | Refills: 0 | Status: DISCONTINUED | OUTPATIENT
Start: 2017-01-28 | End: 2017-01-28

## 2017-01-28 RX ORDER — MORPHINE SULFATE 50 MG/1
6 CAPSULE, EXTENDED RELEASE ORAL ONCE
Qty: 0 | Refills: 0 | Status: DISCONTINUED | OUTPATIENT
Start: 2017-01-28 | End: 2017-01-28

## 2017-01-28 RX ADMIN — Medication 30 MILLIGRAM(S): at 22:27

## 2017-01-28 RX ADMIN — Medication 30 MILLIGRAM(S): at 21:20

## 2017-01-28 RX ADMIN — SODIUM CHLORIDE 3 MILLILITER(S): 9 INJECTION INTRAMUSCULAR; INTRAVENOUS; SUBCUTANEOUS at 21:28

## 2017-01-28 RX ADMIN — SODIUM CHLORIDE 1000 MILLILITER(S): 9 INJECTION INTRAMUSCULAR; INTRAVENOUS; SUBCUTANEOUS at 21:27

## 2017-01-28 RX ADMIN — Medication 250 MILLIGRAM(S): at 21:27

## 2017-01-28 RX ADMIN — MORPHINE SULFATE 6 MILLIGRAM(S): 50 CAPSULE, EXTENDED RELEASE ORAL at 23:38

## 2017-01-28 NOTE — ED PROVIDER NOTE - OBJECTIVE STATEMENT
25 y/o M with a hx of IVDA and previous abscesses to bilateral arms presents to the ED c/o left arm pain, swelling and erythema x3 to 4 days. As per pt, he last used (heroin) 1 week ago and his arm got infected. He was seen here at [facility] 2 days ago and was sent home with abx. Pt states that the abx are not working and he still has pain and the swelling is getting worse. Associated sx include chills. Denies fever, nausea, or vomiting. No further complaints at this time.

## 2017-01-28 NOTE — ED ADULT NURSE REASSESSMENT NOTE - NS ED NURSE REASSESS COMMENT FT1
Pt sleeping in bed, reports pain to left forearm with no relief to toradol. As per MD orders, morphine 6mg IVP administered. Will reassess pain. Pt awaiting CT will continue to monitor.

## 2017-01-28 NOTE — ED ADULT TRIAGE NOTE - CHIEF COMPLAINT QUOTE
pt left arm infection cellulitis/ abscess , states was here couple of days ago was tx with cellulitis with abx, now its spreading swollen reddened sensitive to touch, positive radial pulse to b/l, pt admits to iv drug abusing last injection states about a wk ago

## 2017-01-28 NOTE — ED PROVIDER NOTE - MUSCULOSKELETAL, MLM
Extremities are WNL except for left arm has edema of the entire arm, significant area of redness, induration, and tenderness to the volar aspect of the left forearm with redness streaking up to the left upper arm Extremities are WNL except for left arm has edema of the entire arm, significant area of redness, induration, and tenderness to the volar aspect of the left forearm with focal redness, induration, and scant red streaking up to the elbow

## 2017-01-28 NOTE — ED PROVIDER NOTE - NS ED MD SCRIBE ATTENDING SCRIBE SECTIONS
PHYSICAL EXAM/PROGRESS NOTE/PAST MEDICAL/SURGICAL/SOCIAL HISTORY/HIV/HISTORY OF PRESENT ILLNESS/DISPOSITION/VITAL SIGNS( Pullset)/REVIEW OF SYSTEMS

## 2017-01-28 NOTE — ED PROVIDER NOTE - PROGRESS NOTE DETAILS
Will obtain CT to r/o multi focal abscesses in arm given hx of current abscesses and injections Pt declines I&D, wants to try abx first, ortho consulted, pt evaluated, will admit to medicine for abx, continued observation and management Pt declines I&D, wants to try abx first, ortho consulted, pt evaluated, will admit to medicine for abx, continued observation and management, for universal precautions, obtained 3cc of purulent liquid from abscess and will send for cultures Surgical consult obtained. Patient continues to refuse I&D. Patient agreeable to incision and drainage just requesting dose of Dilaudid 2mg prior to incision. Surgery requested to re-address wound but currently involved with other case and states it will be a couple of hours. Case discussed with Dr. Garcia. Ortho PA contacted. Will obtain CT to r/o multi focal abscesses in arm given hx of current abscesses secondary to heroin injections

## 2017-01-28 NOTE — ED ADULT NURSE NOTE - OBJECTIVE STATEMENT
Pt awake and alert x3, presents to ED c/o left forearm abscess. Pt states he was in ED 2 days ago for same issue and was given oral abx. Pt admits to heroin abuse, states he shot up last night. Pt states left forearm and left hand got very swollen and painful last night. Left forearm swollen, red and hot. Pt denies any recent fevers. Pt resting comfortably in bed, awaiting CT will continue to monitor.

## 2017-01-28 NOTE — ED PROVIDER NOTE - SKIN, MLM
Skin normal color for race, warm, dry and intact. Skin normal color for race, warm, dry and intact otherwise

## 2017-01-29 DIAGNOSIS — L02.91 CUTANEOUS ABSCESS, UNSPECIFIED: ICD-10-CM

## 2017-01-29 DIAGNOSIS — L03.90 CELLULITIS, UNSPECIFIED: ICD-10-CM

## 2017-01-29 DIAGNOSIS — F11.20 OPIOID DEPENDENCE, UNCOMPLICATED: ICD-10-CM

## 2017-01-29 DIAGNOSIS — R69 ILLNESS, UNSPECIFIED: ICD-10-CM

## 2017-01-29 DIAGNOSIS — L02.413 CUTANEOUS ABSCESS OF RIGHT UPPER LIMB: Chronic | ICD-10-CM

## 2017-01-29 DIAGNOSIS — F11.10 OPIOID ABUSE, UNCOMPLICATED: ICD-10-CM

## 2017-01-29 PROCEDURE — 99223 1ST HOSP IP/OBS HIGH 75: CPT

## 2017-01-29 PROCEDURE — 73201 CT UPPER EXTREMITY W/DYE: CPT | Mod: 26,LT

## 2017-01-29 PROCEDURE — 99285 EMERGENCY DEPT VISIT HI MDM: CPT

## 2017-01-29 RX ORDER — LOPERAMIDE HCL 2 MG
2 TABLET ORAL EVERY 6 HOURS
Qty: 0 | Refills: 0 | Status: DISCONTINUED | OUTPATIENT
Start: 2017-01-29 | End: 2017-01-30

## 2017-01-29 RX ORDER — HYDROMORPHONE HYDROCHLORIDE 2 MG/ML
1 INJECTION INTRAMUSCULAR; INTRAVENOUS; SUBCUTANEOUS EVERY 4 HOURS
Qty: 0 | Refills: 0 | Status: DISCONTINUED | OUTPATIENT
Start: 2017-01-29 | End: 2017-01-30

## 2017-01-29 RX ORDER — ALPRAZOLAM 0.25 MG
0.25 TABLET ORAL EVERY 8 HOURS
Qty: 0 | Refills: 0 | Status: DISCONTINUED | OUTPATIENT
Start: 2017-01-29 | End: 2017-01-30

## 2017-01-29 RX ORDER — ONDANSETRON 8 MG/1
4 TABLET, FILM COATED ORAL EVERY 6 HOURS
Qty: 0 | Refills: 0 | Status: COMPLETED | OUTPATIENT
Start: 2017-01-29 | End: 2017-01-29

## 2017-01-29 RX ORDER — ENOXAPARIN SODIUM 100 MG/ML
40 INJECTION SUBCUTANEOUS DAILY
Qty: 0 | Refills: 0 | Status: DISCONTINUED | OUTPATIENT
Start: 2017-01-29 | End: 2017-01-30

## 2017-01-29 RX ORDER — VANCOMYCIN HCL 1 G
VIAL (EA) INTRAVENOUS
Qty: 0 | Refills: 0 | Status: DISCONTINUED | OUTPATIENT
Start: 2017-01-29 | End: 2017-01-29

## 2017-01-29 RX ORDER — HYDROMORPHONE HYDROCHLORIDE 2 MG/ML
1 INJECTION INTRAMUSCULAR; INTRAVENOUS; SUBCUTANEOUS ONCE
Qty: 0 | Refills: 0 | Status: DISCONTINUED | OUTPATIENT
Start: 2017-01-29 | End: 2017-01-29

## 2017-01-29 RX ORDER — SODIUM CHLORIDE 9 MG/ML
1000 INJECTION INTRAMUSCULAR; INTRAVENOUS; SUBCUTANEOUS
Qty: 0 | Refills: 0 | Status: DISCONTINUED | OUTPATIENT
Start: 2017-01-29 | End: 2017-01-30

## 2017-01-29 RX ORDER — VANCOMYCIN HCL 1 G
1000 VIAL (EA) INTRAVENOUS EVERY 8 HOURS
Qty: 0 | Refills: 0 | Status: DISCONTINUED | OUTPATIENT
Start: 2017-01-29 | End: 2017-01-30

## 2017-01-29 RX ADMIN — SODIUM CHLORIDE 125 MILLILITER(S): 9 INJECTION INTRAMUSCULAR; INTRAVENOUS; SUBCUTANEOUS at 06:47

## 2017-01-29 RX ADMIN — HYDROMORPHONE HYDROCHLORIDE 1 MILLIGRAM(S): 2 INJECTION INTRAMUSCULAR; INTRAVENOUS; SUBCUTANEOUS at 09:23

## 2017-01-29 RX ADMIN — MORPHINE SULFATE 6 MILLIGRAM(S): 50 CAPSULE, EXTENDED RELEASE ORAL at 00:10

## 2017-01-29 RX ADMIN — HYDROMORPHONE HYDROCHLORIDE 1 MILLIGRAM(S): 2 INJECTION INTRAMUSCULAR; INTRAVENOUS; SUBCUTANEOUS at 14:06

## 2017-01-29 RX ADMIN — Medication 250 MILLIGRAM(S): at 22:02

## 2017-01-29 RX ADMIN — HYDROMORPHONE HYDROCHLORIDE 1 MILLIGRAM(S): 2 INJECTION INTRAMUSCULAR; INTRAVENOUS; SUBCUTANEOUS at 06:46

## 2017-01-29 RX ADMIN — HYDROMORPHONE HYDROCHLORIDE 1 MILLIGRAM(S): 2 INJECTION INTRAMUSCULAR; INTRAVENOUS; SUBCUTANEOUS at 22:45

## 2017-01-29 RX ADMIN — ONDANSETRON 4 MILLIGRAM(S): 8 TABLET, FILM COATED ORAL at 22:01

## 2017-01-29 RX ADMIN — Medication 250 MILLIGRAM(S): at 14:45

## 2017-01-29 RX ADMIN — HYDROMORPHONE HYDROCHLORIDE 1 MILLIGRAM(S): 2 INJECTION INTRAMUSCULAR; INTRAVENOUS; SUBCUTANEOUS at 09:38

## 2017-01-29 RX ADMIN — Medication 0.25 MILLIGRAM(S): at 22:01

## 2017-01-29 RX ADMIN — HYDROMORPHONE HYDROCHLORIDE 1 MILLIGRAM(S): 2 INJECTION INTRAMUSCULAR; INTRAVENOUS; SUBCUTANEOUS at 06:47

## 2017-01-29 RX ADMIN — HYDROMORPHONE HYDROCHLORIDE 1 MILLIGRAM(S): 2 INJECTION INTRAMUSCULAR; INTRAVENOUS; SUBCUTANEOUS at 10:21

## 2017-01-29 RX ADMIN — HYDROMORPHONE HYDROCHLORIDE 1 MILLIGRAM(S): 2 INJECTION INTRAMUSCULAR; INTRAVENOUS; SUBCUTANEOUS at 18:22

## 2017-01-29 RX ADMIN — HYDROMORPHONE HYDROCHLORIDE 1 MILLIGRAM(S): 2 INJECTION INTRAMUSCULAR; INTRAVENOUS; SUBCUTANEOUS at 18:37

## 2017-01-29 RX ADMIN — HYDROMORPHONE HYDROCHLORIDE 1 MILLIGRAM(S): 2 INJECTION INTRAMUSCULAR; INTRAVENOUS; SUBCUTANEOUS at 14:45

## 2017-01-29 RX ADMIN — HYDROMORPHONE HYDROCHLORIDE 1 MILLIGRAM(S): 2 INJECTION INTRAMUSCULAR; INTRAVENOUS; SUBCUTANEOUS at 10:07

## 2017-01-29 NOTE — PROGRESS NOTE ADULT - SUBJECTIVE AND OBJECTIVE BOX
Went to see patient, Surgery team performing bedside I&D    D/W Dr. Garcia, appreciate Surgery consult, will defer to surgery. Continue care as per surgery/primary team.

## 2017-01-29 NOTE — H&P ADULT. - EXTREMITIES COMMENTS
swelling and erythema of the left forearm and arm. the swelling is extending from the fingers up to about 3 inches below the axilla while the erythema is extending from above the wrist to above the elbow. no limitation of fingers movements.

## 2017-01-29 NOTE — CHART NOTE - NSCHARTNOTEFT_GEN_A_CORE
Refusal of procedure.    On multiple occasions overnight, pt advised that definitive treatment of his left arm abscess would have to be through incision and drainage of his abscess. Pt refused I&D without administration of 2-3mg IV dilaudid. Pt informed that amount of IV opioids is unnecessary and risks outweigh benefits to give that high a dosage of opioids for the procedure and that primary pain control would be through local anesthesia. Pt continued to disagree and refuse without opioid administration. Pt was informed risks of not performing incision and drainage of Left arm abscess, worsening spread of infection, sepsis but would still no longer consent to the incision and drainage without IV opioids ahead of time.  At this time no surgical intervention possible without pt consent, please reconsult surgery as needed. Seen and examined with senior resident, discussed with ED physician.

## 2017-01-29 NOTE — CONSULT NOTE ADULT - ATTENDING COMMENTS
Ortho Trauma Attending:  Agree with above PA note.  Note edited where necessary.  CT scan reviewed, shows a collection in the forearm.  In the interim, the patient has had a bedisde I&D by General Surgery.  As the procedure is complete, orthopedics will sign off. Please feel free to reconsult if needed in the future.    Artis Garcia MD  Orthopaedic Trauma Surgery

## 2017-01-29 NOTE — ED ADULT NURSE REASSESSMENT NOTE - NS ED NURSE REASSESS COMMENT FT1
Pt a&ox3, #20G IV noted to Right hand, #20G noted to Right forearm. IVF infusing per MD orders. Redness and swelling noted to Left arm, with pt rating pain 8/10 throbbing. Pt verbalizes concerns regarding pain medication and fear of pain experienced during upcoming abscess drainage procedure, pt states "im already having pain again even after the medication this morning" MD Escalante paged, awaiting return call @ this time. Will continue to monitor and reassess

## 2017-01-29 NOTE — CONSULT NOTE ADULT - SUBJECTIVE AND OBJECTIVE BOX
27 y/o M known IVDA  5 day h/o of Left forearm pain, erythema and swelling, progressively worsening. Symptoms began 1 week prior after pt attempted to inject IV drugs through left forearm. Pt arrived in ED 2 days ago was discharged with oral antibiotics without resolution of symptoms so he attempted to drain himself with a needle. Needle aspiration in ED had 3ccs of purulent fluid. Pt endorses subjective fevers, chills without nausea or vomiting    PMHx: IVDA  PSHx: Intraoperative I&D of R AC abscess  Medications: Clindamycin  Allergies: None    Vital Signs Last 24 Hrs  T(C): 37.1, Max: 37.1 (01-28 @ 19:43)  T(F): 98.7, Max: 98.7 (01-28 @ 19:43)  HR: 96 (96 - 96)  BP: 132/74 (132/74 - 132/74)  BP(mean): --  RR: 18 (18 - 18)  SpO2: 99% (99% - 99%)    AVSS, NAD, AAOX3  Chest expansion symmetric  RRR, S1S2nl  Lungs CTAB  Abd soft, ND, NTTP, no rebound/guarding  Ext: LUE forearm [id number] of tense, indurated blanching erythema with mild fluctuance, TTP, diffuse swelling of entire LUE, sensation intact, ulnar and radial pulse 2+, cap refill<2sec      CT LUE: 5cm L forearm collection likely abscess

## 2017-01-29 NOTE — ED BEHAVIORAL HEALTH ASSESSMENT NOTE - DESCRIPTION
Uneventful    Patient has left forearm/arm spreading Cellulitis and would be admitted for IV antibiotic None Single male domiciled with Mother

## 2017-01-29 NOTE — ED BEHAVIORAL HEALTH ASSESSMENT NOTE - HPI (INCLUDE ILLNESS QUALITY, SEVERITY, DURATION, TIMING, CONTEXT, MODIFYING FACTORS, ASSOCIATED SIGNS AND SYMPTOMS)
Patient a 25 y/o single  male, unemployed, domiciled with his mother, hx of IV heroin abuse, no prior psychiatric Hx, no Prior SI/SA no other medical issues was BIB/Self due to above complaints.    Patient endorsing that he was in ED at [facility] 2 days earlier, was discharged with Clindamycin 300 mg Q-6 hrly, but continued to have the hand swelling which is more prominent to the point that he has troubled hand movements. He has been shooting heroin for the past 2 1/2 years, currently using 5 bags 2-3 times a week, and endorsed that he was in detox at [facility] in  for 4-5 days, was clean for a week and re-started again using Heroin 2-3 times a week. he added that he was treated for Heroin abuse with methadone 5 mg BID, which helped him  and was clean for a week. He was not sure why he stopped the Methadone, he tried with Suboxone, but feels that the Methadone was more helpful for abstaining from heroin and would like to start over again. he was advised that he has to go to a Methadone program daily and later would be given weekend dosage once the trust is ensured with the patient and agreed to start over again. He denied any psychiatric hx, denied prior SI/SA, denied other drug abuse, his brother  in 2017 for reasons unknown, who used drugs previously. Patient a 27 y/o single  male, unemployed, domiciled with his mother, hx of IV heroin abuse, no prior psychiatric Hx, no Prior SI/SA no other medical issues was BIB/Self due to above complaints.    Patient endorsing that he was in ED at [facility] 2 days earlier, was discharged with Clindamycin 300 mg Q-6 hrly, but continued to have the hand swelling which is more prominent to the point that he has troubled hand movements. He has been shooting heroin for the past 2 1/2 years, currently using 5 bags 2-3 times a week, and endorsed that he was in detox at [facility] in  for 4-5 days, was clean for a week and re-started again using Heroin 2-3 times a week. He added that he was treated for Heroin abuse with methadone 5 mg BID, which helped him  and was clean for a week. He was not sure why he stopped the Methadone, he tried with Suboxone, but feels that the Methadone was more helpful for abstaining from heroin and would like to start over again. He was advised that he has to go to a Methadone program daily and later would be given weekend dosage once the trust is ensured with the patient and agreed to start over again. He denied any psychiatric hx, denied prior SI/SA, denied other drug abuse, his brother  in 2017 for reasons unknown, who used drugs previously. He denied any perceptual experiences, paranoia or depression etc.

## 2017-01-29 NOTE — ED ADULT NURSE REASSESSMENT NOTE - NS ED NURSE REASSESS COMMENT FT1
Pt medicated with 1mg Dilaudid per MD order, surgical team bedside for abscess drainage @ this time, will continue to monitor and reassess

## 2017-01-29 NOTE — H&P ADULT. - HISTORY OF PRESENT ILLNESS
25 y/o male with h/o drug abuse, came to the ER because of left upper ext. pain and swelling started about 5 days ago which gradually became worse. fever and chills for 1 day. no limitation in fingers movements. no paraesthesia. CT scan shows 7.8x5.3x4.3 cm abscess and myoscitis. I&D was offered to the patient earlier and was denied by the patient as per surgery resident. on discussing I&D with the patient, patient added that he wants to drain the abscess but with pain medications like dilaudid 2 mg but the surgeon refused to give him pain medication beside the local anaesthesia. patient has h/o right arm abscess drainage about 3 weeks ago with wound culture showing strep. veridans and coagulase negative staph. sensitive to cipro and levaquin. no h/o valvular heart disease or congenital heart disease. no h/o endocarditis 25 y/o male with h/o drug abuse, came to the ER because of left upper ext. pain and swelling started about 5 days ago which gradually became worse. fever and chills for 1 day. no limitation in fingers movements. no paraesthesia. CT scan shows 7.8x5.3x4.3 cm abscess and myoscitis. I&D was offered to the patient earlier and was denied by the patient as per surgery resident. on discussing I&D with the patient, patient added that he wants to drain the abscess but with pain medications like dilaudid 2 mg but the surgeon refused to give him pain medication beside the local anaesthesia. patient has h/o right arm abscess drainage about 3 weeks ago with wound culture showing strep. veridans and coagulase negative staph. sensitive to cipro and levaquin. no h/o valvular heart disease or congenital heart disease. no h/o endocarditis. Surgery and orthopedic consults were called by ER and surgery was recalled by Dr. Zhang after the patient agreed to have I&D

## 2017-01-29 NOTE — CHART NOTE - NSCHARTNOTEFT_GEN_A_CORE
was seen and examined at the bedside. He appears comfortable on my exam. He received his incision and drainage at the bedside by surgery and his LUE still is red and swollen, tender to touch.    I'm changing his antibiotics to IV vancomycin as he was just discharged from the hospital in January and is an active IVDA.  His vitals and physical exam were performed at the bedside.

## 2017-01-29 NOTE — ED ADULT NURSE REASSESSMENT NOTE - NS ED NURSE REASSESS COMMENT FT1
Report handed off to Kellie Osorio RN. Pt ambulatory to pain management with steady gait. IVF infusing. No apparent distress noted.

## 2017-01-29 NOTE — CONSULT NOTE ADULT - PROBLEM SELECTOR RECOMMENDATION 9
-Recommend medicine admission for treatment of cellulitis  -Will evaluate for I&D of wound  -Pain control PRN  -IV ABx  -F/u aspiration cultures  -Discussed with senior resident and attending

## 2017-01-29 NOTE — CHART NOTE - NSCHARTNOTEFT_GEN_A_CORE
Surgery Progress Note:    Patient seen and examined at bedside. Please seen full consult note written by Dr. Lazcano.  Incision and drainage of RUE abscess performed at bedside. Please see full procedure note written by Dr. Keating.   Patient admitted for IV antibiotics. Surgery to follow for daily wound care.

## 2017-01-29 NOTE — CONSULT NOTE ADULT - SUBJECTIVE AND OBJECTIVE BOX
Pt Name: CARLYN NAGY    MRN: [id number]      Patient is a 26y Male presenting to the emergency department with a chief complaint of pain to his left forearm with localized cellulitis and also swelling of the forearm and hand.  He has a known history of IVDA.  Found patient laying in stretcher in ED.  Left arm red and swollen.  States that this started approximately 1 week ago when he attempted to inject IV into his left forearm.  On 1/25/2017, patient was seen in the ED for a smaller area of redness to his left forearm.  A sonogram showed a small area of fluid collection.  He was discharged with oral antibiotics.  However area of collection still got worse so this made the patient return for further treatment.  He also stated that he tried to drain the area of fluid collection himself.  He does give history of having fever and chills, but without any nausea or vomiting.      HEALTH ISSUES - PROBLEM Dx:  Abscess: Abscess      .      REVIEW OF SYSTEMS      General:	NEG    Skin/Breast:   Redness and induration to left forearm  	  Ophthalmologic:  NEg  	  ENMT:	NEG    Respiratory and Thorax:NEG  	  Cardiovascular:	NEG    Gastrointestinal:	NEG    Genitourinary:	NEG    Musculoskeletal:  Left Forearm swelling and redness noted with mild streaking,  however able to ROM of hand and elbow without any difficulty    Neurological:	NEG    Psychiatric:	NEG    Hematology/Lymphatics:	NEG    Endocrine:	NEG    Allergic/Immunologic: NEG	    ROS is otherwise negative.    PAST MEDICAL & SURGICAL HISTORY:  Abscess  Heroin abuse  No significant past surgical history      Allergies: No Known Allergies      Medications: HYDROmorphone  Injectable 1milliGRAM(s) IV Push once  HYDROmorphone  Injectable 1milliGRAM(s) IV Push once      FAMILY HISTORY:  No pertinent family history in first degree relatives  : non-contributory    Social History:                            12.9   15.24 )-----------( 254      ( 28 Jan 2017 21:03 )             36.5     28 Jan 2017 21:03    134    |  95     |  9.0    ----------------------------<  136    4.0     |  24.0   |  0.83     Ca    9.2        28 Jan 2017 21:03    TPro  7.6    /  Alb  3.9    /  TBili  0.6    /  DBili  x      /  AST  13     /  ALT  17     /  AlkPhos  46     28 Jan 2017 21:03      PHYSICAL EXAM:    Vital Signs Last 24 Hrs  T(C): 37.4, Max: 37.4 (01-29 @ 04:28)  T(F): 99.3, Max: 99.3 (01-29 @ 04:28)  HR: 79 (79 - 96)  BP: 114/61 (114/61 - 132/74)  BP(mean): --  RR: 18 (18 - 18)  SpO2: 99% (99% - 99%)  Daily Height in cm: 193.04 (28 Jan 2017 19:43)    Daily     Appearance: Alert, responsive, in no acute distress.    Neurological: Sensation is grossly intact to light touch. 5/5 motor function of all extremities. No focal deficits or weaknesses found.    Skin: Redness noted to Left Forearm, area of possible injection noted, however no drainage noted at this time.      Vascular: 2+ radial pulses. Cap refill < 2 sec. No cyanosis.    Musculoskeletal:         Left Upper Extremity:  + ROM of hand/Elbow without any difficulty, + TTP over left forearm, compartments soft, however area of redness noted, possible area of injection noted, but no present drainage noted.  Sensation intact.      Imaging Studies:    A/P:  Pt is a  26y Male with Left Forearm Cellulitis with probable abscess    PLAN:   * F/U CT Scan of upper extremity to evaluate for fluid collection  * Medical admission for IV antibiotics  * Pain medication as needed for comfort  * Recommend warm compresses to forearm/with elevation  * Possible I&D if no improvement noted  * Discussed with Dr. Garcia

## 2017-01-30 LAB
ANION GAP SERPL CALC-SCNC: 10 MMOL/L — SIGNIFICANT CHANGE UP (ref 5–17)
BUN SERPL-MCNC: 7 MG/DL — LOW (ref 8–20)
CALCIUM SERPL-MCNC: 9.3 MG/DL — SIGNIFICANT CHANGE UP (ref 8.6–10.2)
CHLORIDE SERPL-SCNC: 103 MMOL/L — SIGNIFICANT CHANGE UP (ref 98–107)
CO2 SERPL-SCNC: 27 MMOL/L — SIGNIFICANT CHANGE UP (ref 22–29)
CREAT SERPL-MCNC: 0.7 MG/DL — SIGNIFICANT CHANGE UP (ref 0.5–1.3)
CRP SERPL-MCNC: 18.5 MG/DL — HIGH (ref 0–0.4)
CRP SERPL-MCNC: 18.9 MG/DL — HIGH (ref 0–0.4)
GLUCOSE SERPL-MCNC: 116 MG/DL — HIGH (ref 70–115)
HCT VFR BLD CALC: 34.4 % — LOW (ref 42–52)
HGB BLD-MCNC: 12.1 G/DL — LOW (ref 14–18)
MAGNESIUM SERPL-MCNC: 2.2 MG/DL — SIGNIFICANT CHANGE UP (ref 1.8–2.5)
MCHC RBC-ENTMCNC: 29.7 PG — SIGNIFICANT CHANGE UP (ref 27–31)
MCHC RBC-ENTMCNC: 35.2 G/DL — SIGNIFICANT CHANGE UP (ref 32–36)
MCV RBC AUTO: 84.5 FL — SIGNIFICANT CHANGE UP (ref 80–94)
PHOSPHATE SERPL-MCNC: 3.4 MG/DL — SIGNIFICANT CHANGE UP (ref 2.4–4.7)
PLATELET # BLD AUTO: 259 K/UL — SIGNIFICANT CHANGE UP (ref 150–400)
POTASSIUM SERPL-MCNC: 4.1 MMOL/L — SIGNIFICANT CHANGE UP (ref 3.5–5.3)
POTASSIUM SERPL-SCNC: 4.1 MMOL/L — SIGNIFICANT CHANGE UP (ref 3.5–5.3)
RBC # BLD: 4.07 M/UL — LOW (ref 4.6–6.2)
RBC # FLD: 13.4 % — SIGNIFICANT CHANGE UP (ref 11–15.6)
SODIUM SERPL-SCNC: 140 MMOL/L — SIGNIFICANT CHANGE UP (ref 135–145)
WBC # BLD: 10.98 K/UL — HIGH (ref 4.8–10.8)
WBC # FLD AUTO: 10.98 K/UL — HIGH (ref 4.8–10.8)

## 2017-01-30 RX ORDER — KETOROLAC TROMETHAMINE 30 MG/ML
30 SYRINGE (ML) INJECTION ONCE
Qty: 0 | Refills: 0 | Status: DISCONTINUED | OUTPATIENT
Start: 2017-01-30 | End: 2017-01-31

## 2017-01-30 RX ORDER — FENTANYL CITRATE 50 UG/ML
50 INJECTION INTRAVENOUS
Qty: 0 | Refills: 0 | Status: DISCONTINUED | OUTPATIENT
Start: 2017-01-30 | End: 2017-01-31

## 2017-01-30 RX ORDER — SODIUM CHLORIDE 9 MG/ML
1000 INJECTION INTRAMUSCULAR; INTRAVENOUS; SUBCUTANEOUS
Qty: 0 | Refills: 0 | Status: DISCONTINUED | OUTPATIENT
Start: 2017-01-30 | End: 2017-01-31

## 2017-01-30 RX ORDER — HYDROMORPHONE HYDROCHLORIDE 2 MG/ML
0.5 INJECTION INTRAMUSCULAR; INTRAVENOUS; SUBCUTANEOUS
Qty: 0 | Refills: 0 | Status: DISCONTINUED | OUTPATIENT
Start: 2017-01-30 | End: 2017-01-30

## 2017-01-30 RX ORDER — ENOXAPARIN SODIUM 100 MG/ML
40 INJECTION SUBCUTANEOUS DAILY
Qty: 0 | Refills: 0 | Status: DISCONTINUED | OUTPATIENT
Start: 2017-01-31 | End: 2017-02-04

## 2017-01-30 RX ORDER — VANCOMYCIN HCL 1 G
1000 VIAL (EA) INTRAVENOUS EVERY 12 HOURS
Qty: 0 | Refills: 0 | Status: DISCONTINUED | OUTPATIENT
Start: 2017-01-30 | End: 2017-01-31

## 2017-01-30 RX ORDER — DOCUSATE SODIUM 100 MG
100 CAPSULE ORAL
Qty: 0 | Refills: 0 | Status: DISCONTINUED | OUTPATIENT
Start: 2017-01-31 | End: 2017-02-04

## 2017-01-30 RX ORDER — HYDROMORPHONE HYDROCHLORIDE 2 MG/ML
1 INJECTION INTRAMUSCULAR; INTRAVENOUS; SUBCUTANEOUS EVERY 4 HOURS
Qty: 0 | Refills: 0 | Status: DISCONTINUED | OUTPATIENT
Start: 2017-01-31 | End: 2017-02-04

## 2017-01-30 RX ORDER — ACETAMINOPHEN 500 MG
650 TABLET ORAL EVERY 6 HOURS
Qty: 0 | Refills: 0 | Status: DISCONTINUED | OUTPATIENT
Start: 2017-01-31 | End: 2017-02-04

## 2017-01-30 RX ORDER — LACTOBACILLUS ACIDOPHILUS 100MM CELL
1 CAPSULE ORAL
Qty: 0 | Refills: 0 | Status: DISCONTINUED | OUTPATIENT
Start: 2017-01-30 | End: 2017-01-30

## 2017-01-30 RX ORDER — INFLUENZA VIRUS VACCINE 15; 15; 15; 15 UG/.5ML; UG/.5ML; UG/.5ML; UG/.5ML
0.5 SUSPENSION INTRAMUSCULAR ONCE
Qty: 0 | Refills: 0 | Status: DISCONTINUED | OUTPATIENT
Start: 2017-01-30 | End: 2017-02-04

## 2017-01-30 RX ORDER — OXYCODONE HYDROCHLORIDE 5 MG/1
5 TABLET ORAL EVERY 4 HOURS
Qty: 0 | Refills: 0 | Status: DISCONTINUED | OUTPATIENT
Start: 2017-01-30 | End: 2017-02-04

## 2017-01-30 RX ADMIN — HYDROMORPHONE HYDROCHLORIDE 1 MILLIGRAM(S): 2 INJECTION INTRAMUSCULAR; INTRAVENOUS; SUBCUTANEOUS at 09:50

## 2017-01-30 RX ADMIN — OXYCODONE HYDROCHLORIDE 5 MILLIGRAM(S): 5 TABLET ORAL at 22:09

## 2017-01-30 RX ADMIN — Medication 0.25 MILLIGRAM(S): at 06:07

## 2017-01-30 RX ADMIN — Medication 250 MILLIGRAM(S): at 13:12

## 2017-01-30 RX ADMIN — HYDROMORPHONE HYDROCHLORIDE 1 MILLIGRAM(S): 2 INJECTION INTRAMUSCULAR; INTRAVENOUS; SUBCUTANEOUS at 04:39

## 2017-01-30 RX ADMIN — SODIUM CHLORIDE 125 MILLILITER(S): 9 INJECTION INTRAMUSCULAR; INTRAVENOUS; SUBCUTANEOUS at 03:41

## 2017-01-30 RX ADMIN — HYDROMORPHONE HYDROCHLORIDE 0.5 MILLIGRAM(S): 2 INJECTION INTRAMUSCULAR; INTRAVENOUS; SUBCUTANEOUS at 22:26

## 2017-01-30 RX ADMIN — Medication 0.25 MILLIGRAM(S): at 14:34

## 2017-01-30 RX ADMIN — HYDROMORPHONE HYDROCHLORIDE 0.5 MILLIGRAM(S): 2 INJECTION INTRAMUSCULAR; INTRAVENOUS; SUBCUTANEOUS at 23:54

## 2017-01-30 RX ADMIN — HYDROMORPHONE HYDROCHLORIDE 0.5 MILLIGRAM(S): 2 INJECTION INTRAMUSCULAR; INTRAVENOUS; SUBCUTANEOUS at 21:51

## 2017-01-30 RX ADMIN — OXYCODONE HYDROCHLORIDE 5 MILLIGRAM(S): 5 TABLET ORAL at 23:54

## 2017-01-30 RX ADMIN — HYDROMORPHONE HYDROCHLORIDE 0.5 MILLIGRAM(S): 2 INJECTION INTRAMUSCULAR; INTRAVENOUS; SUBCUTANEOUS at 22:27

## 2017-01-30 RX ADMIN — HYDROMORPHONE HYDROCHLORIDE 1 MILLIGRAM(S): 2 INJECTION INTRAMUSCULAR; INTRAVENOUS; SUBCUTANEOUS at 03:57

## 2017-01-30 RX ADMIN — HYDROMORPHONE HYDROCHLORIDE 1 MILLIGRAM(S): 2 INJECTION INTRAMUSCULAR; INTRAVENOUS; SUBCUTANEOUS at 00:27

## 2017-01-30 RX ADMIN — HYDROMORPHONE HYDROCHLORIDE 0.5 MILLIGRAM(S): 2 INJECTION INTRAMUSCULAR; INTRAVENOUS; SUBCUTANEOUS at 21:50

## 2017-01-30 RX ADMIN — HYDROMORPHONE HYDROCHLORIDE 1 MILLIGRAM(S): 2 INJECTION INTRAMUSCULAR; INTRAVENOUS; SUBCUTANEOUS at 09:46

## 2017-01-30 RX ADMIN — HYDROMORPHONE HYDROCHLORIDE 0.5 MILLIGRAM(S): 2 INJECTION INTRAMUSCULAR; INTRAVENOUS; SUBCUTANEOUS at 21:36

## 2017-01-30 RX ADMIN — HYDROMORPHONE HYDROCHLORIDE 1 MILLIGRAM(S): 2 INJECTION INTRAMUSCULAR; INTRAVENOUS; SUBCUTANEOUS at 14:49

## 2017-01-30 RX ADMIN — Medication 250 MILLIGRAM(S): at 05:24

## 2017-01-30 RX ADMIN — HYDROMORPHONE HYDROCHLORIDE 0.5 MILLIGRAM(S): 2 INJECTION INTRAMUSCULAR; INTRAVENOUS; SUBCUTANEOUS at 22:08

## 2017-01-30 RX ADMIN — HYDROMORPHONE HYDROCHLORIDE 1 MILLIGRAM(S): 2 INJECTION INTRAMUSCULAR; INTRAVENOUS; SUBCUTANEOUS at 14:34

## 2017-01-30 RX ADMIN — HYDROMORPHONE HYDROCHLORIDE 0.5 MILLIGRAM(S): 2 INJECTION INTRAMUSCULAR; INTRAVENOUS; SUBCUTANEOUS at 22:07

## 2017-01-30 NOTE — SBIRT NOTE. - NSSBIRTSERVICES_GEN_A_ED_FT
Referral for complete assessment and level of care determination at a certified treatment facility was completed by giving the patient information for treatment facilities that met their needs and encouraging them to call for an appointment. A call was not made to a facility because:    Patient currently has a treatment plan setup/FSL    Audit Score: 0  DAST Score: 6  Duration = 15 Minutes

## 2017-01-30 NOTE — PROGRESS NOTE ADULT - SUBJECTIVE AND OBJECTIVE BOX
is a young male w/hx of occasional IVDA every "2-3 days" who presented earlier this month with a right upper extremity cellulitis requiring IV antibiotics. He now presents with LUE which is very red, swollen and he underwent an incision and drainage at the bedside. Previous cultures demonstrated a strep viridans/coag negative strep bacteria, but given the seriousness of a limb infection, he may benefit from another debridement. He's currently NPO. Of note, he is tolerating vancomycin well and his white count is trending down.    Summary:   REVIEW OF SYSTEMS      General:	"I'm ok, just having left upper arm pain"    Skin/Breast:  	  Ophthalmologic:  	  ENMT:	    Respiratory and Thorax:  	  Cardiovascular:	    Gastrointestinal:	    Genitourinary:	    Musculoskeletal:	    Neurological:	    Psychiatric:	    Hematology/Lymphatics:	    Endocrine:	    Allergic/Immunologic:	  Vital Signs Last 24 Hrs  T(C): 36.9, Max: 37.9 (01-29 @ 19:20)  T(F): 98.5, Max: 100.3 (01-29 @ 19:20)  HR: 62 (62 - 80)  BP: 105/58 (101/61 - 112/58)  BP(mean): --  RR: 18 (16 - 18)  SpO2: 99% (98% - 99%)  PHYSICAL EXAM:   GEN: young male, resting in bed, cooperative, mentating, nontoxic  HEENT: MMM  CVS: S1S2  PULM: CTABL, good breath sounds  ABD: soft, nontender  EXTREM: +LUE pain, erythema, and redness, tender to touch, bandage on  NEURO: intact  PSYCH: intact  SKIN:                          12.1   10.98 )-----------( 259      ( 30 Jan 2017 08:11 )             34.4     30 Jan 2017 08:11    140    |  103    |  7.0    ----------------------------<  116    4.1     |  27.0   |  0.70     Ca    9.3        30 Jan 2017 08:11  Phos  3.4       30 Jan 2017 08:11  Mg     2.2       30 Jan 2017 08:11    TPro  7.6    /  Alb  3.9    /  TBili  0.6    /  DBili  x      /  AST  13     /  ALT  17     /  AlkPhos  46     28 Jan 2017 21:03    LIVER FUNCTIONS - ( 28 Jan 2017 21:03 )  Alb: 3.9 g/dL / Pro: 7.6 g/dL / ALK PHOS: 46 U/L / ALT: 17 U/L / AST: 13 U/L / GGT: x           PT/INR - ( 28 Jan 2017 21:03 )   PT: 16.0 sec;   INR: 1.45 ratio             Radiology:     MEDICATIONS  (STANDING):  sodium chloride 0.9%. 1000milliLiter(s) IV Continuous <Continuous>  vancomycin  IVPB 1000milliGRAM(s) IV Intermittent every 8 hours  influenza   Vaccine 0.5milliLiter(s) IntraMuscular once    MEDICATIONS  (PRN):  HYDROmorphone  Injectable 1milliGRAM(s) IV Push every 4 hours PRN Severe Pain (7 - 10)  oxyCODONE  5 mG/acetaminophen 325 mG 1Tablet(s) Oral every 6 hours PRN Moderate Pain (4 - 6)  enoxaparin Injectable 40milliGRAM(s) SubCutaneous daily PRN DVT prophylaxis  loperamide 2milliGRAM(s) Oral every 6 hours PRN Diarrhea  ALPRAZolam 0.25milliGRAM(s) Oral every 8 hours PRN anxiety  is a young male w/hx of occasional IVDA every "2-3 days" who presented earlier this month with a right upper extremity cellulitis requiring IV antibiotics. He now presents with LUE which is very red, swollen and he underwent an incision and drainage at the bedside. Previous cultures demonstrated a strep viridans/coag negative strep bacteria, but given the seriousness of a limb infection, he may benefit from another debridement. He's currently NPO. Of note, he is tolerating vancomycin well and his white count is trending down. Also, spoke with surgery and they will transfer  to their service.    Summary:   REVIEW OF SYSTEMS      General:	"I'm ok, just having left upper arm pain"    Skin/Breast:  	  Ophthalmologic:  	  ENMT:	    Respiratory and Thorax:  	  Cardiovascular:	    Gastrointestinal:	    Genitourinary:	    Musculoskeletal:	    Neurological:	    Psychiatric:	    Hematology/Lymphatics:	    Endocrine:	    Allergic/Immunologic:	  Vital Signs Last 24 Hrs  T(C): 36.9, Max: 37.9 (01-29 @ 19:20)  T(F): 98.5, Max: 100.3 (01-29 @ 19:20)  HR: 62 (62 - 80)  BP: 105/58 (101/61 - 112/58)  BP(mean): --  RR: 18 (16 - 18)  SpO2: 99% (98% - 99%)  PHYSICAL EXAM:   GEN: young male, resting in bed, cooperative, mentating, nontoxic  HEENT: MMM  CVS: S1S2  PULM: CTABL, good breath sounds  ABD: soft, nontender  EXTREM: +LUE pain, erythema, and redness, tender to touch, bandage on  NEURO: intact  PSYCH: intact  SKIN:                          12.1   10.98 )-----------( 259      ( 30 Jan 2017 08:11 )             34.4     30 Jan 2017 08:11    140    |  103    |  7.0    ----------------------------<  116    4.1     |  27.0   |  0.70     Ca    9.3        30 Jan 2017 08:11  Phos  3.4       30 Jan 2017 08:11  Mg     2.2       30 Jan 2017 08:11    TPro  7.6    /  Alb  3.9    /  TBili  0.6    /  DBili  x      /  AST  13     /  ALT  17     /  AlkPhos  46     28 Jan 2017 21:03    LIVER FUNCTIONS - ( 28 Jan 2017 21:03 )  Alb: 3.9 g/dL / Pro: 7.6 g/dL / ALK PHOS: 46 U/L / ALT: 17 U/L / AST: 13 U/L / GGT: x           PT/INR - ( 28 Jan 2017 21:03 )   PT: 16.0 sec;   INR: 1.45 ratio             Radiology:     MEDICATIONS  (STANDING):  sodium chloride 0.9%. 1000milliLiter(s) IV Continuous <Continuous>  vancomycin  IVPB 1000milliGRAM(s) IV Intermittent every 8 hours  influenza   Vaccine 0.5milliLiter(s) IntraMuscular once    MEDICATIONS  (PRN):  HYDROmorphone  Injectable 1milliGRAM(s) IV Push every 4 hours PRN Severe Pain (7 - 10)  oxyCODONE  5 mG/acetaminophen 325 mG 1Tablet(s) Oral every 6 hours PRN Moderate Pain (4 - 6)  enoxaparin Injectable 40milliGRAM(s) SubCutaneous daily PRN DVT prophylaxis  loperamide 2milliGRAM(s) Oral every 6 hours PRN Diarrhea  ALPRAZolam 0.25milliGRAM(s) Oral every 8 hours PRN anxiety

## 2017-01-31 LAB
ANION GAP SERPL CALC-SCNC: 13 MMOL/L — SIGNIFICANT CHANGE UP (ref 5–17)
BUN SERPL-MCNC: 11 MG/DL — SIGNIFICANT CHANGE UP (ref 8–20)
CALCIUM SERPL-MCNC: 8.9 MG/DL — SIGNIFICANT CHANGE UP (ref 8.6–10.2)
CHLORIDE SERPL-SCNC: 102 MMOL/L — SIGNIFICANT CHANGE UP (ref 98–107)
CO2 SERPL-SCNC: 24 MMOL/L — SIGNIFICANT CHANGE UP (ref 22–29)
CREAT SERPL-MCNC: 0.72 MG/DL — SIGNIFICANT CHANGE UP (ref 0.5–1.3)
GLUCOSE SERPL-MCNC: 130 MG/DL — HIGH (ref 70–115)
HCT VFR BLD CALC: 34.8 % — LOW (ref 42–52)
HGB BLD-MCNC: 11.7 G/DL — LOW (ref 14–18)
INR BLD: 1.34 RATIO — HIGH (ref 0.88–1.16)
MAGNESIUM SERPL-MCNC: 2.1 MG/DL — SIGNIFICANT CHANGE UP (ref 1.8–2.5)
MCHC RBC-ENTMCNC: 28.8 PG — SIGNIFICANT CHANGE UP (ref 27–31)
MCHC RBC-ENTMCNC: 33.6 G/DL — SIGNIFICANT CHANGE UP (ref 32–36)
MCV RBC AUTO: 85.7 FL — SIGNIFICANT CHANGE UP (ref 80–94)
PHOSPHATE SERPL-MCNC: 4.1 MG/DL — SIGNIFICANT CHANGE UP (ref 2.4–4.7)
PLATELET # BLD AUTO: 282 K/UL — SIGNIFICANT CHANGE UP (ref 150–400)
POTASSIUM SERPL-MCNC: 3.9 MMOL/L — SIGNIFICANT CHANGE UP (ref 3.5–5.3)
POTASSIUM SERPL-SCNC: 3.9 MMOL/L — SIGNIFICANT CHANGE UP (ref 3.5–5.3)
PROTHROM AB SERPL-ACNC: 14.8 SEC — HIGH (ref 10–13.1)
RBC # BLD: 4.06 M/UL — LOW (ref 4.6–6.2)
RBC # FLD: 13.5 % — SIGNIFICANT CHANGE UP (ref 11–15.6)
SODIUM SERPL-SCNC: 139 MMOL/L — SIGNIFICANT CHANGE UP (ref 135–145)
VANCOMYCIN TROUGH SERPL-MCNC: <4 UG/ML — LOW (ref 10–20)
WBC # BLD: 8.65 K/UL — SIGNIFICANT CHANGE UP (ref 4.8–10.8)
WBC # FLD AUTO: 8.65 K/UL — SIGNIFICANT CHANGE UP (ref 4.8–10.8)

## 2017-01-31 RX ORDER — VANCOMYCIN HCL 1 G
VIAL (EA) INTRAVENOUS
Qty: 0 | Refills: 0 | Status: DISCONTINUED | OUTPATIENT
Start: 2017-01-31 | End: 2017-01-31

## 2017-01-31 RX ORDER — VANCOMYCIN HCL 1 G
1000 VIAL (EA) INTRAVENOUS EVERY 8 HOURS
Qty: 0 | Refills: 0 | Status: DISCONTINUED | OUTPATIENT
Start: 2017-01-31 | End: 2017-02-02

## 2017-01-31 RX ORDER — VANCOMYCIN HCL 1 G
1000 VIAL (EA) INTRAVENOUS EVERY 8 HOURS
Qty: 0 | Refills: 0 | Status: DISCONTINUED | OUTPATIENT
Start: 2017-01-31 | End: 2017-01-31

## 2017-01-31 RX ORDER — VANCOMYCIN HCL 1 G
1000 VIAL (EA) INTRAVENOUS ONCE
Qty: 0 | Refills: 0 | Status: DISCONTINUED | OUTPATIENT
Start: 2017-01-31 | End: 2017-01-31

## 2017-01-31 RX ADMIN — ENOXAPARIN SODIUM 40 MILLIGRAM(S): 100 INJECTION SUBCUTANEOUS at 12:38

## 2017-01-31 RX ADMIN — HYDROMORPHONE HYDROCHLORIDE 1 MILLIGRAM(S): 2 INJECTION INTRAMUSCULAR; INTRAVENOUS; SUBCUTANEOUS at 14:05

## 2017-01-31 RX ADMIN — Medication 250 MILLIGRAM(S): at 23:39

## 2017-01-31 RX ADMIN — HYDROMORPHONE HYDROCHLORIDE 1 MILLIGRAM(S): 2 INJECTION INTRAMUSCULAR; INTRAVENOUS; SUBCUTANEOUS at 18:22

## 2017-01-31 RX ADMIN — HYDROMORPHONE HYDROCHLORIDE 1 MILLIGRAM(S): 2 INJECTION INTRAMUSCULAR; INTRAVENOUS; SUBCUTANEOUS at 04:52

## 2017-01-31 RX ADMIN — HYDROMORPHONE HYDROCHLORIDE 1 MILLIGRAM(S): 2 INJECTION INTRAMUSCULAR; INTRAVENOUS; SUBCUTANEOUS at 16:24

## 2017-01-31 RX ADMIN — Medication 100 MILLIGRAM(S): at 07:08

## 2017-01-31 RX ADMIN — HYDROMORPHONE HYDROCHLORIDE 1 MILLIGRAM(S): 2 INJECTION INTRAMUSCULAR; INTRAVENOUS; SUBCUTANEOUS at 05:10

## 2017-01-31 RX ADMIN — HYDROMORPHONE HYDROCHLORIDE 1 MILLIGRAM(S): 2 INJECTION INTRAMUSCULAR; INTRAVENOUS; SUBCUTANEOUS at 09:51

## 2017-01-31 RX ADMIN — OXYCODONE HYDROCHLORIDE 5 MILLIGRAM(S): 5 TABLET ORAL at 12:38

## 2017-01-31 RX ADMIN — OXYCODONE HYDROCHLORIDE 5 MILLIGRAM(S): 5 TABLET ORAL at 17:10

## 2017-01-31 RX ADMIN — OXYCODONE HYDROCHLORIDE 5 MILLIGRAM(S): 5 TABLET ORAL at 16:38

## 2017-01-31 RX ADMIN — HYDROMORPHONE HYDROCHLORIDE 1 MILLIGRAM(S): 2 INJECTION INTRAMUSCULAR; INTRAVENOUS; SUBCUTANEOUS at 18:07

## 2017-01-31 RX ADMIN — OXYCODONE HYDROCHLORIDE 5 MILLIGRAM(S): 5 TABLET ORAL at 21:10

## 2017-01-31 RX ADMIN — Medication 250 MILLIGRAM(S): at 15:39

## 2017-01-31 RX ADMIN — OXYCODONE HYDROCHLORIDE 5 MILLIGRAM(S): 5 TABLET ORAL at 20:44

## 2017-01-31 RX ADMIN — Medication 100 MILLIGRAM(S): at 17:03

## 2017-01-31 RX ADMIN — HYDROMORPHONE HYDROCHLORIDE 1 MILLIGRAM(S): 2 INJECTION INTRAMUSCULAR; INTRAVENOUS; SUBCUTANEOUS at 09:30

## 2017-01-31 RX ADMIN — OXYCODONE HYDROCHLORIDE 5 MILLIGRAM(S): 5 TABLET ORAL at 12:47

## 2017-01-31 RX ADMIN — OXYCODONE HYDROCHLORIDE 5 MILLIGRAM(S): 5 TABLET ORAL at 04:53

## 2017-01-31 RX ADMIN — OXYCODONE HYDROCHLORIDE 5 MILLIGRAM(S): 5 TABLET ORAL at 05:30

## 2017-01-31 RX ADMIN — HYDROMORPHONE HYDROCHLORIDE 1 MILLIGRAM(S): 2 INJECTION INTRAMUSCULAR; INTRAVENOUS; SUBCUTANEOUS at 22:26

## 2017-01-31 RX ADMIN — Medication 250 MILLIGRAM(S): at 06:50

## 2017-01-31 RX ADMIN — HYDROMORPHONE HYDROCHLORIDE 1 MILLIGRAM(S): 2 INJECTION INTRAMUSCULAR; INTRAVENOUS; SUBCUTANEOUS at 22:40

## 2017-01-31 NOTE — CHART NOTE - NSCHARTNOTEFT_GEN_A_CORE
Postop note    Pt seen and examined bedside. No acute postoperative events. Pt states pain and swelling in LUE controlled and not worsening. Tolerating diet, voiding, ambulating passing gas. Denies f/c/n/v    Vital Signs Last 24 Hrs  T(C): 37.2, Max: 37.2 (01-30 @ 21:10)  T(F): 99, Max: 99 (01-30 @ 21:10)  HR: 74 (62 - 78)  BP: 129/77 (96/56 - 134/70)  BP(mean): --  RR: 16 (15 - 20)  SpO2: 99% (96% - 100%)    AVSS, NAD AAOx3  Chest expansion symmetric  RRR, S1S2nl  lungs CTAB  Ext: LUE wrapped in ACE and kerlix, swollen, strength/sensation intact, RP2+, no focal deficits, cap refill<2sec    A/P 27 y/o M with LUE abscess s/p OR I&D POD0, clinically stable  -Daily wound care  -C/W IV Abx  -Regular diet  Will discuss further planning with attending

## 2017-01-31 NOTE — PROGRESS NOTE ADULT - SUBJECTIVE AND OBJECTIVE BOX
SUBJECTIVE/24 hour events:  Patient is a 26yMale POD#1 of lue abscess I&D, on vanco , dose increased to Q8, repeat vacno trough in am 2/1      Vital Signs Last 24 Hrs  T(C): 37, Max: 37.2 (01-30 @ 21:10)  T(F): 98.6, Max: 99 (01-30 @ 21:10)  HR: 74 (64 - 78)  BP: 112/68 (96/56 - 134/70)  BP(mean): --  RR: 14 (14 - 20)  SpO2: 99% (96% - 100%)  Drug Dosing Weight  Height (cm): 193 (28 Jan 2017 19:43)  Weight (kg): 99.8 (28 Jan 2017 19:43)  BMI (kg/m2): 26.8 (28 Jan 2017 19:43)  BSA (m2): 2.31 (28 Jan 2017 19:43)  I&O's Detail    Allergies    No Known Allergies    Intolerances                              11.7   8.65  )-----------( 282      ( 31 Jan 2017 06:14 )             34.8   31 Jan 2017 06:14    139    |  102    |  11.0   ----------------------------<  130    3.9     |  24.0   |  0.72     Ca    8.9        31 Jan 2017 06:14  Phos  4.1       31 Jan 2017 06:14  Mg     2.1       31 Jan 2017 06:14    PT/INR - ( 31 Jan 2017 06:14 )   PT: 14.8 sec;   INR: 1.34 ratio             ROS:    PHYSICAL EXAM:  Constitutional: no complaints, pain controlled     Respiratory: no respiratory distress, CTAB, needs OOB, incentive spirometer     Cardiovascular: NSR on telemetry     Gastrointestinal: abdomen soft , on regular diet, bowel regimen if necessary     Genitourinary: no issues, good urine output     Extremities: POD#1 of LUE I&D, NVI, able to move fingers, mild-moderate edema to left hand, good radial pulse +2, good cap refill, can move fingers with no issues, on vanco low trough , frequency increased to Q8 hours.      Neurological: psych  consult pending, needs social work for possible     Skin: warm, dry and no rashes           MEDICATIONS  (STANDING):  influenza   Vaccine 0.5milliLiter(s) IntraMuscular once  sodium chloride 0.9%. 1000milliLiter(s) IV Continuous <Continuous>  docusate sodium 100milliGRAM(s) Oral two times a day  enoxaparin Injectable 40milliGRAM(s) SubCutaneous daily  vancomycin  IVPB  IV Intermittent   vancomycin  IVPB 1000milliGRAM(s) IV Intermittent once  vancomycin  IVPB 1000milliGRAM(s) IV Intermittent every 8 hours    MEDICATIONS  (PRN):  ketorolac   Injectable 30milliGRAM(s) IV Push once PRN Moderate Pain  fentaNYL    Injectable 50MICROGram(s) IV Push every 5 minutes PRN Severe Pain  HYDROmorphone  Injectable 1milliGRAM(s) IV Push every 4 hours PRN Severe Pain (7 - 10)  oxyCODONE IR 5milliGRAM(s) Oral every 4 hours PRN Moderate Pain (4 - 6)  acetaminophen   Tablet. 650milliGRAM(s) Oral every 6 hours PRN Mild Pain (1 - 3)      RADIOLOGY STUDIES:    CULTURES:

## 2017-01-31 NOTE — PROGRESS NOTE ADULT - PROBLEM SELECTOR PLAN 1
POD#1 of merry I&D, continue vanco increased to Q8 hours, need to follow up 2nd trough, first trough low. Continue neurovascular checks, dressing changes, occupational therapy consult for upper extremity exercises.
-on IV vancomycin Q8H  -s/p incision and drainage and may need further debridement in the OR  -he is medically optimized for surgery

## 2017-01-31 NOTE — PROGRESS NOTE ADULT - ATTENDING COMMENTS
JENNIFER with improvement of edema and erythema. wounds with clean base, skin bleeding controlled with pressure.  wounds repacked with gauze,   continue IV antibiotics, follow cultures.  JENNIFER elevation.

## 2017-01-31 NOTE — PROGRESS NOTE ADULT - PROBLEM SELECTOR PLAN 2
needs social work consult for possible drug rehabilitation on discharge, pysch consult for recent loss in the family and drug use
-no signs of withdrawal currently, appears very comfortable  -prn meds for symptomatic management

## 2017-02-01 LAB
ANION GAP SERPL CALC-SCNC: 13 MMOL/L — SIGNIFICANT CHANGE UP (ref 5–17)
BASOPHILS # BLD AUTO: 0 K/UL — SIGNIFICANT CHANGE UP (ref 0–0.2)
BASOPHILS NFR BLD AUTO: 0.6 % — SIGNIFICANT CHANGE UP (ref 0–2)
BUN SERPL-MCNC: 11 MG/DL — SIGNIFICANT CHANGE UP (ref 8–20)
CALCIUM SERPL-MCNC: 9.8 MG/DL — SIGNIFICANT CHANGE UP (ref 8.6–10.2)
CHLORIDE SERPL-SCNC: 101 MMOL/L — SIGNIFICANT CHANGE UP (ref 98–107)
CO2 SERPL-SCNC: 28 MMOL/L — SIGNIFICANT CHANGE UP (ref 22–29)
CREAT SERPL-MCNC: 0.75 MG/DL — SIGNIFICANT CHANGE UP (ref 0.5–1.3)
EOSINOPHIL # BLD AUTO: 0.4 K/UL — SIGNIFICANT CHANGE UP (ref 0–0.5)
EOSINOPHIL NFR BLD AUTO: 6.4 % — HIGH (ref 0–5)
GLUCOSE SERPL-MCNC: 100 MG/DL — SIGNIFICANT CHANGE UP (ref 70–115)
HCT VFR BLD CALC: 36.6 % — LOW (ref 42–52)
HGB BLD-MCNC: 12.5 G/DL — LOW (ref 14–18)
LYMPHOCYTES # BLD AUTO: 2.3 K/UL — SIGNIFICANT CHANGE UP (ref 1–4.8)
LYMPHOCYTES # BLD AUTO: 36 % — SIGNIFICANT CHANGE UP (ref 20–55)
MAGNESIUM SERPL-MCNC: 2.2 MG/DL — SIGNIFICANT CHANGE UP (ref 1.8–2.5)
MCHC RBC-ENTMCNC: 29.3 PG — SIGNIFICANT CHANGE UP (ref 27–31)
MCHC RBC-ENTMCNC: 34.2 G/DL — SIGNIFICANT CHANGE UP (ref 32–36)
MCV RBC AUTO: 85.7 FL — SIGNIFICANT CHANGE UP (ref 80–94)
MONOCYTES # BLD AUTO: 0.5 K/UL — SIGNIFICANT CHANGE UP (ref 0–0.8)
MONOCYTES NFR BLD AUTO: 7.5 % — SIGNIFICANT CHANGE UP (ref 3–10)
NEUTROPHILS # BLD AUTO: 3.2 K/UL — SIGNIFICANT CHANGE UP (ref 1.8–8)
NEUTROPHILS NFR BLD AUTO: 49.2 % — SIGNIFICANT CHANGE UP (ref 37–73)
PLATELET # BLD AUTO: 320 K/UL — SIGNIFICANT CHANGE UP (ref 150–400)
POTASSIUM SERPL-MCNC: 4.3 MMOL/L — SIGNIFICANT CHANGE UP (ref 3.5–5.3)
POTASSIUM SERPL-SCNC: 4.3 MMOL/L — SIGNIFICANT CHANGE UP (ref 3.5–5.3)
RBC # BLD: 4.27 M/UL — LOW (ref 4.6–6.2)
RBC # FLD: 13.5 % — SIGNIFICANT CHANGE UP (ref 11–15.6)
SODIUM SERPL-SCNC: 142 MMOL/L — SIGNIFICANT CHANGE UP (ref 135–145)
VANCOMYCIN TROUGH SERPL-MCNC: 9.9 UG/ML — LOW (ref 10–20)
WBC # BLD: 6.41 K/UL — SIGNIFICANT CHANGE UP (ref 4.8–10.8)
WBC # FLD AUTO: 6.41 K/UL — SIGNIFICANT CHANGE UP (ref 4.8–10.8)

## 2017-02-01 RX ORDER — HYDROMORPHONE HYDROCHLORIDE 2 MG/ML
1 INJECTION INTRAMUSCULAR; INTRAVENOUS; SUBCUTANEOUS ONCE
Qty: 0 | Refills: 0 | Status: DISCONTINUED | OUTPATIENT
Start: 2017-02-01 | End: 2017-02-01

## 2017-02-01 RX ADMIN — OXYCODONE HYDROCHLORIDE 5 MILLIGRAM(S): 5 TABLET ORAL at 05:56

## 2017-02-01 RX ADMIN — Medication 250 MILLIGRAM(S): at 23:10

## 2017-02-01 RX ADMIN — HYDROMORPHONE HYDROCHLORIDE 1 MILLIGRAM(S): 2 INJECTION INTRAMUSCULAR; INTRAVENOUS; SUBCUTANEOUS at 12:15

## 2017-02-01 RX ADMIN — HYDROMORPHONE HYDROCHLORIDE 1 MILLIGRAM(S): 2 INJECTION INTRAMUSCULAR; INTRAVENOUS; SUBCUTANEOUS at 11:19

## 2017-02-01 RX ADMIN — OXYCODONE HYDROCHLORIDE 5 MILLIGRAM(S): 5 TABLET ORAL at 09:29

## 2017-02-01 RX ADMIN — HYDROMORPHONE HYDROCHLORIDE 1 MILLIGRAM(S): 2 INJECTION INTRAMUSCULAR; INTRAVENOUS; SUBCUTANEOUS at 03:20

## 2017-02-01 RX ADMIN — OXYCODONE HYDROCHLORIDE 5 MILLIGRAM(S): 5 TABLET ORAL at 13:34

## 2017-02-01 RX ADMIN — Medication 100 MILLIGRAM(S): at 17:28

## 2017-02-01 RX ADMIN — HYDROMORPHONE HYDROCHLORIDE 1 MILLIGRAM(S): 2 INJECTION INTRAMUSCULAR; INTRAVENOUS; SUBCUTANEOUS at 12:00

## 2017-02-01 RX ADMIN — OXYCODONE HYDROCHLORIDE 5 MILLIGRAM(S): 5 TABLET ORAL at 01:20

## 2017-02-01 RX ADMIN — OXYCODONE HYDROCHLORIDE 5 MILLIGRAM(S): 5 TABLET ORAL at 22:30

## 2017-02-01 RX ADMIN — HYDROMORPHONE HYDROCHLORIDE 1 MILLIGRAM(S): 2 INJECTION INTRAMUSCULAR; INTRAVENOUS; SUBCUTANEOUS at 07:19

## 2017-02-01 RX ADMIN — OXYCODONE HYDROCHLORIDE 5 MILLIGRAM(S): 5 TABLET ORAL at 05:26

## 2017-02-01 RX ADMIN — OXYCODONE HYDROCHLORIDE 5 MILLIGRAM(S): 5 TABLET ORAL at 14:05

## 2017-02-01 RX ADMIN — HYDROMORPHONE HYDROCHLORIDE 1 MILLIGRAM(S): 2 INJECTION INTRAMUSCULAR; INTRAVENOUS; SUBCUTANEOUS at 03:13

## 2017-02-01 RX ADMIN — ENOXAPARIN SODIUM 40 MILLIGRAM(S): 100 INJECTION SUBCUTANEOUS at 11:19

## 2017-02-01 RX ADMIN — OXYCODONE HYDROCHLORIDE 5 MILLIGRAM(S): 5 TABLET ORAL at 00:54

## 2017-02-01 RX ADMIN — Medication 100 MILLIGRAM(S): at 05:29

## 2017-02-01 RX ADMIN — HYDROMORPHONE HYDROCHLORIDE 1 MILLIGRAM(S): 2 INJECTION INTRAMUSCULAR; INTRAVENOUS; SUBCUTANEOUS at 19:58

## 2017-02-01 RX ADMIN — HYDROMORPHONE HYDROCHLORIDE 1 MILLIGRAM(S): 2 INJECTION INTRAMUSCULAR; INTRAVENOUS; SUBCUTANEOUS at 19:35

## 2017-02-01 RX ADMIN — OXYCODONE HYDROCHLORIDE 5 MILLIGRAM(S): 5 TABLET ORAL at 18:05

## 2017-02-01 RX ADMIN — Medication 250 MILLIGRAM(S): at 06:11

## 2017-02-01 RX ADMIN — HYDROMORPHONE HYDROCHLORIDE 1 MILLIGRAM(S): 2 INJECTION INTRAMUSCULAR; INTRAVENOUS; SUBCUTANEOUS at 12:16

## 2017-02-01 RX ADMIN — OXYCODONE HYDROCHLORIDE 5 MILLIGRAM(S): 5 TABLET ORAL at 09:59

## 2017-02-01 RX ADMIN — OXYCODONE HYDROCHLORIDE 5 MILLIGRAM(S): 5 TABLET ORAL at 21:47

## 2017-02-01 RX ADMIN — HYDROMORPHONE HYDROCHLORIDE 1 MILLIGRAM(S): 2 INJECTION INTRAMUSCULAR; INTRAVENOUS; SUBCUTANEOUS at 15:21

## 2017-02-01 RX ADMIN — HYDROMORPHONE HYDROCHLORIDE 1 MILLIGRAM(S): 2 INJECTION INTRAMUSCULAR; INTRAVENOUS; SUBCUTANEOUS at 15:35

## 2017-02-01 RX ADMIN — OXYCODONE HYDROCHLORIDE 5 MILLIGRAM(S): 5 TABLET ORAL at 17:34

## 2017-02-01 RX ADMIN — Medication 250 MILLIGRAM(S): at 15:21

## 2017-02-01 RX ADMIN — HYDROMORPHONE HYDROCHLORIDE 1 MILLIGRAM(S): 2 INJECTION INTRAMUSCULAR; INTRAVENOUS; SUBCUTANEOUS at 07:35

## 2017-02-01 NOTE — PROGRESS NOTE ADULT - SUBJECTIVE AND OBJECTIVE BOX
Feels better  afebrile  L arm swelling in hand less  less erthematous swellin remarkedly diminished  Dressing changed  supportive care

## 2017-02-01 NOTE — PROGRESS NOTE ADULT - SUBJECTIVE AND OBJECTIVE BOX
Psychiatric consult/assessment was completed on patient while he was still in ED on 1/29/17, assessment is in [place] EMR, denied sx of depression or psychosis.   1) SW and SBIRT team intervened regarding IVDA, patient refused inpatient detox/rehab at this time but accepted outpatient resource information that wont interfere with current employment, see sunrise notes

## 2017-02-01 NOTE — PROGRESS NOTE ADULT - SUBJECTIVE AND OBJECTIVE BOX
Anesthesia Post Op  25yo s/p I and D of arm under GA  Patient reports no anesthetic issues, pain well controlled, no N/V reported  VSS

## 2017-02-02 LAB
ANISOCYTOSIS BLD QL: SLIGHT — SIGNIFICANT CHANGE UP
CULTURE RESULTS: SIGNIFICANT CHANGE UP
CULTURE RESULTS: SIGNIFICANT CHANGE UP
EOSINOPHIL NFR BLD AUTO: 4 % — SIGNIFICANT CHANGE UP (ref 0–6)
HCT VFR BLD CALC: 35.9 % — LOW (ref 42–52)
HGB BLD-MCNC: 12.3 G/DL — LOW (ref 14–18)
HYPOCHROMIA BLD QL: SLIGHT — SIGNIFICANT CHANGE UP
LYMPHOCYTES # BLD AUTO: 29 % — SIGNIFICANT CHANGE UP (ref 20–55)
MACROCYTES BLD QL: SLIGHT — SIGNIFICANT CHANGE UP
MCHC RBC-ENTMCNC: 29.4 PG — SIGNIFICANT CHANGE UP (ref 27–31)
MCHC RBC-ENTMCNC: 34.3 G/DL — SIGNIFICANT CHANGE UP (ref 32–36)
MCV RBC AUTO: 85.7 FL — SIGNIFICANT CHANGE UP (ref 80–94)
MICROCYTES BLD QL: SLIGHT — SIGNIFICANT CHANGE UP
MONOCYTES NFR BLD AUTO: 10 % — SIGNIFICANT CHANGE UP (ref 3–10)
NEUTROPHILS NFR BLD AUTO: 57 % — SIGNIFICANT CHANGE UP (ref 37–73)
OVALOCYTES BLD QL SMEAR: SLIGHT — SIGNIFICANT CHANGE UP
PLAT MORPH BLD: NORMAL — SIGNIFICANT CHANGE UP
PLATELET # BLD AUTO: 353 K/UL — SIGNIFICANT CHANGE UP (ref 150–400)
POIKILOCYTOSIS BLD QL AUTO: SLIGHT — SIGNIFICANT CHANGE UP
RBC # BLD: 4.19 M/UL — LOW (ref 4.6–6.2)
RBC # FLD: 13.3 % — SIGNIFICANT CHANGE UP (ref 11–15.6)
RBC BLD AUTO: ABNORMAL
SPECIMEN SOURCE: SIGNIFICANT CHANGE UP
SPECIMEN SOURCE: SIGNIFICANT CHANGE UP
VANCOMYCIN TROUGH SERPL-MCNC: 8.4 UG/ML — LOW (ref 10–20)
WBC # BLD: 6.37 K/UL — SIGNIFICANT CHANGE UP (ref 4.8–10.8)
WBC # FLD AUTO: 6.37 K/UL — SIGNIFICANT CHANGE UP (ref 4.8–10.8)

## 2017-02-02 RX ORDER — VANCOMYCIN HCL 1 G
1000 VIAL (EA) INTRAVENOUS ONCE
Qty: 0 | Refills: 0 | Status: COMPLETED | OUTPATIENT
Start: 2017-02-02 | End: 2017-02-02

## 2017-02-02 RX ORDER — VANCOMYCIN HCL 1 G
1000 VIAL (EA) INTRAVENOUS EVERY 6 HOURS
Qty: 0 | Refills: 0 | Status: DISCONTINUED | OUTPATIENT
Start: 2017-02-02 | End: 2017-02-03

## 2017-02-02 RX ADMIN — OXYCODONE HYDROCHLORIDE 5 MILLIGRAM(S): 5 TABLET ORAL at 03:07

## 2017-02-02 RX ADMIN — Medication 100 MILLIGRAM(S): at 19:10

## 2017-02-02 RX ADMIN — Medication 100 MILLIGRAM(S): at 05:57

## 2017-02-02 RX ADMIN — OXYCODONE HYDROCHLORIDE 5 MILLIGRAM(S): 5 TABLET ORAL at 07:42

## 2017-02-02 RX ADMIN — HYDROMORPHONE HYDROCHLORIDE 1 MILLIGRAM(S): 2 INJECTION INTRAMUSCULAR; INTRAVENOUS; SUBCUTANEOUS at 00:45

## 2017-02-02 RX ADMIN — Medication 250 MILLIGRAM(S): at 23:32

## 2017-02-02 RX ADMIN — OXYCODONE HYDROCHLORIDE 5 MILLIGRAM(S): 5 TABLET ORAL at 17:44

## 2017-02-02 RX ADMIN — OXYCODONE HYDROCHLORIDE 5 MILLIGRAM(S): 5 TABLET ORAL at 02:17

## 2017-02-02 RX ADMIN — HYDROMORPHONE HYDROCHLORIDE 1 MILLIGRAM(S): 2 INJECTION INTRAMUSCULAR; INTRAVENOUS; SUBCUTANEOUS at 18:23

## 2017-02-02 RX ADMIN — HYDROMORPHONE HYDROCHLORIDE 1 MILLIGRAM(S): 2 INJECTION INTRAMUSCULAR; INTRAVENOUS; SUBCUTANEOUS at 14:11

## 2017-02-02 RX ADMIN — OXYCODONE HYDROCHLORIDE 5 MILLIGRAM(S): 5 TABLET ORAL at 21:00

## 2017-02-02 RX ADMIN — OXYCODONE HYDROCHLORIDE 5 MILLIGRAM(S): 5 TABLET ORAL at 16:00

## 2017-02-02 RX ADMIN — HYDROMORPHONE HYDROCHLORIDE 1 MILLIGRAM(S): 2 INJECTION INTRAMUSCULAR; INTRAVENOUS; SUBCUTANEOUS at 22:27

## 2017-02-02 RX ADMIN — HYDROMORPHONE HYDROCHLORIDE 1 MILLIGRAM(S): 2 INJECTION INTRAMUSCULAR; INTRAVENOUS; SUBCUTANEOUS at 00:14

## 2017-02-02 RX ADMIN — HYDROMORPHONE HYDROCHLORIDE 1 MILLIGRAM(S): 2 INJECTION INTRAMUSCULAR; INTRAVENOUS; SUBCUTANEOUS at 22:42

## 2017-02-02 RX ADMIN — Medication 250 MILLIGRAM(S): at 06:40

## 2017-02-02 RX ADMIN — OXYCODONE HYDROCHLORIDE 5 MILLIGRAM(S): 5 TABLET ORAL at 20:22

## 2017-02-02 RX ADMIN — OXYCODONE HYDROCHLORIDE 5 MILLIGRAM(S): 5 TABLET ORAL at 13:00

## 2017-02-02 RX ADMIN — ENOXAPARIN SODIUM 40 MILLIGRAM(S): 100 INJECTION SUBCUTANEOUS at 11:57

## 2017-02-02 RX ADMIN — Medication 250 MILLIGRAM(S): at 16:40

## 2017-02-02 RX ADMIN — OXYCODONE HYDROCHLORIDE 5 MILLIGRAM(S): 5 TABLET ORAL at 08:45

## 2017-02-02 RX ADMIN — HYDROMORPHONE HYDROCHLORIDE 1 MILLIGRAM(S): 2 INJECTION INTRAMUSCULAR; INTRAVENOUS; SUBCUTANEOUS at 15:08

## 2017-02-02 RX ADMIN — HYDROMORPHONE HYDROCHLORIDE 1 MILLIGRAM(S): 2 INJECTION INTRAMUSCULAR; INTRAVENOUS; SUBCUTANEOUS at 19:09

## 2017-02-02 RX ADMIN — OXYCODONE HYDROCHLORIDE 5 MILLIGRAM(S): 5 TABLET ORAL at 11:55

## 2017-02-02 RX ADMIN — HYDROMORPHONE HYDROCHLORIDE 1 MILLIGRAM(S): 2 INJECTION INTRAMUSCULAR; INTRAVENOUS; SUBCUTANEOUS at 11:16

## 2017-02-02 RX ADMIN — HYDROMORPHONE HYDROCHLORIDE 1 MILLIGRAM(S): 2 INJECTION INTRAMUSCULAR; INTRAVENOUS; SUBCUTANEOUS at 09:54

## 2017-02-02 RX ADMIN — HYDROMORPHONE HYDROCHLORIDE 1 MILLIGRAM(S): 2 INJECTION INTRAMUSCULAR; INTRAVENOUS; SUBCUTANEOUS at 05:57

## 2017-02-02 RX ADMIN — HYDROMORPHONE HYDROCHLORIDE 1 MILLIGRAM(S): 2 INJECTION INTRAMUSCULAR; INTRAVENOUS; SUBCUTANEOUS at 06:31

## 2017-02-02 NOTE — PROGRESS NOTE ADULT - ASSESSMENT
POD # 3 s/p I and D of L arm abscess  - cw abx, local wound care  - pain control  - f/u psych  - f/u vanc trough

## 2017-02-02 NOTE — PROGRESS NOTE ADULT - SUBJECTIVE AND OBJECTIVE BOX
INTERVAL HPI/OVERNIGHT EVENTS:  No acute events overnight. afebrile. pain controlled      MEDICATIONS  (STANDING):  influenza   Vaccine 0.5milliLiter(s) IntraMuscular once  docusate sodium 100milliGRAM(s) Oral two times a day  enoxaparin Injectable 40milliGRAM(s) SubCutaneous daily  vancomycin  IVPB 1000milliGRAM(s) IV Intermittent every 8 hours    MEDICATIONS  (PRN):  HYDROmorphone  Injectable 1milliGRAM(s) IV Push every 4 hours PRN Severe Pain (7 - 10)  oxyCODONE IR 5milliGRAM(s) Oral every 4 hours PRN Moderate Pain (4 - 6)  acetaminophen   Tablet. 650milliGRAM(s) Oral every 6 hours PRN Mild Pain (1 - 3)      Vital Signs Last 24 Hrs  T(C): 36.1, Max: 36.8 (02-02 @ 00:56)  T(F): 97, Max: 98.2 (02-02 @ 00:56)  HR: 45 (45 - 62)  BP: 111/62 (111/62 - 124/71)  BP(mean): --  RR: 18 (18 - 18)  SpO2: 97% (97% - 100%)    PE  Gen: nad  Pulm: ctab  CV: S1S2  Abd:Soft, NT, ND  Ext: LUE wounds c/d/i  Vasc: palp pulses peripheral and central  Neuro: AAO x3      I&O's Detail    I & Os for current day (as of 02 Feb 2017 11:31)  =============================================  IN:    Solution: 500 ml    Total IN: 500 ml  ---------------------------------------------  OUT:    Total OUT: 0 ml  ---------------------------------------------  Total NET: 500 ml      LABS:                        12.3   6.37  )-----------( 353      ( 02 Feb 2017 06:50 )             35.9     01 Feb 2017 08:29    142    |  101    |  11.0   ----------------------------<  100    4.3     |  28.0   |  0.75     Ca    9.8        01 Feb 2017 08:29  Mg     2.2       01 Feb 2017 08:29            RADIOLOGY & ADDITIONAL STUDIES:

## 2017-02-03 LAB — VANCOMYCIN TROUGH SERPL-MCNC: 8.9 UG/ML — LOW (ref 10–20)

## 2017-02-03 RX ORDER — VANCOMYCIN HCL 1 G
1250 VIAL (EA) INTRAVENOUS EVERY 8 HOURS
Qty: 0 | Refills: 0 | Status: DISCONTINUED | OUTPATIENT
Start: 2017-02-03 | End: 2017-02-04

## 2017-02-03 RX ADMIN — OXYCODONE HYDROCHLORIDE 5 MILLIGRAM(S): 5 TABLET ORAL at 14:29

## 2017-02-03 RX ADMIN — ENOXAPARIN SODIUM 40 MILLIGRAM(S): 100 INJECTION SUBCUTANEOUS at 14:18

## 2017-02-03 RX ADMIN — OXYCODONE HYDROCHLORIDE 5 MILLIGRAM(S): 5 TABLET ORAL at 14:05

## 2017-02-03 RX ADMIN — HYDROMORPHONE HYDROCHLORIDE 1 MILLIGRAM(S): 2 INJECTION INTRAMUSCULAR; INTRAVENOUS; SUBCUTANEOUS at 11:17

## 2017-02-03 RX ADMIN — Medication 166.67 MILLIGRAM(S): at 14:30

## 2017-02-03 RX ADMIN — HYDROMORPHONE HYDROCHLORIDE 1 MILLIGRAM(S): 2 INJECTION INTRAMUSCULAR; INTRAVENOUS; SUBCUTANEOUS at 09:45

## 2017-02-03 RX ADMIN — OXYCODONE HYDROCHLORIDE 5 MILLIGRAM(S): 5 TABLET ORAL at 00:50

## 2017-02-03 RX ADMIN — HYDROMORPHONE HYDROCHLORIDE 1 MILLIGRAM(S): 2 INJECTION INTRAMUSCULAR; INTRAVENOUS; SUBCUTANEOUS at 03:10

## 2017-02-03 RX ADMIN — HYDROMORPHONE HYDROCHLORIDE 1 MILLIGRAM(S): 2 INJECTION INTRAMUSCULAR; INTRAVENOUS; SUBCUTANEOUS at 19:28

## 2017-02-03 RX ADMIN — OXYCODONE HYDROCHLORIDE 5 MILLIGRAM(S): 5 TABLET ORAL at 19:12

## 2017-02-03 RX ADMIN — OXYCODONE HYDROCHLORIDE 5 MILLIGRAM(S): 5 TABLET ORAL at 10:42

## 2017-02-03 RX ADMIN — OXYCODONE HYDROCHLORIDE 5 MILLIGRAM(S): 5 TABLET ORAL at 06:06

## 2017-02-03 RX ADMIN — Medication 100 MILLIGRAM(S): at 17:07

## 2017-02-03 RX ADMIN — Medication 250 MILLIGRAM(S): at 06:06

## 2017-02-03 RX ADMIN — OXYCODONE HYDROCHLORIDE 5 MILLIGRAM(S): 5 TABLET ORAL at 23:00

## 2017-02-03 RX ADMIN — Medication 166.67 MILLIGRAM(S): at 22:06

## 2017-02-03 RX ADMIN — OXYCODONE HYDROCHLORIDE 5 MILLIGRAM(S): 5 TABLET ORAL at 01:25

## 2017-02-03 RX ADMIN — Medication 100 MILLIGRAM(S): at 06:06

## 2017-02-03 RX ADMIN — OXYCODONE HYDROCHLORIDE 5 MILLIGRAM(S): 5 TABLET ORAL at 22:05

## 2017-02-03 RX ADMIN — HYDROMORPHONE HYDROCHLORIDE 1 MILLIGRAM(S): 2 INJECTION INTRAMUSCULAR; INTRAVENOUS; SUBCUTANEOUS at 15:30

## 2017-02-03 RX ADMIN — OXYCODONE HYDROCHLORIDE 5 MILLIGRAM(S): 5 TABLET ORAL at 06:46

## 2017-02-03 RX ADMIN — HYDROMORPHONE HYDROCHLORIDE 1 MILLIGRAM(S): 2 INJECTION INTRAMUSCULAR; INTRAVENOUS; SUBCUTANEOUS at 02:54

## 2017-02-03 RX ADMIN — HYDROMORPHONE HYDROCHLORIDE 1 MILLIGRAM(S): 2 INJECTION INTRAMUSCULAR; INTRAVENOUS; SUBCUTANEOUS at 17:04

## 2017-02-03 RX ADMIN — HYDROMORPHONE HYDROCHLORIDE 1 MILLIGRAM(S): 2 INJECTION INTRAMUSCULAR; INTRAVENOUS; SUBCUTANEOUS at 07:09

## 2017-02-03 RX ADMIN — HYDROMORPHONE HYDROCHLORIDE 1 MILLIGRAM(S): 2 INJECTION INTRAMUSCULAR; INTRAVENOUS; SUBCUTANEOUS at 13:18

## 2017-02-03 RX ADMIN — HYDROMORPHONE HYDROCHLORIDE 1 MILLIGRAM(S): 2 INJECTION INTRAMUSCULAR; INTRAVENOUS; SUBCUTANEOUS at 19:30

## 2017-02-03 NOTE — PROGRESS NOTE ADULT - SUBJECTIVE AND OBJECTIVE BOX
INTERVAL HPI/OVERNIGHT EVENTS:  Seen and eval during AM rounds. No acute events overnight. Dressing's where changed in AM, wound w good granulation tissue, minimal bleeding, achieved hemostasis w pressure.     MEDICATIONS  (STANDING):  influenza   Vaccine 0.5milliLiter(s) IntraMuscular once  docusate sodium 100milliGRAM(s) Oral two times a day  enoxaparin Injectable 40milliGRAM(s) SubCutaneous daily  vancomycin  IVPB 1000milliGRAM(s) IV Intermittent every 6 hours    MEDICATIONS  (PRN):  HYDROmorphone  Injectable 1milliGRAM(s) IV Push every 4 hours PRN Severe Pain (7 - 10)  oxyCODONE IR 5milliGRAM(s) Oral every 4 hours PRN Moderate Pain (4 - 6)  acetaminophen   Tablet. 650milliGRAM(s) Oral every 6 hours PRN Mild Pain (1 - 3)      Vital Signs Last 24 Hrs  T(C): 37.1, Max: 37.1 (02-03 @ 07:49)  T(F): 98.8, Max: 98.8 (02-03 @ 07:49)  HR: 51 (51 - 55)  BP: 119/67 (112/66 - 124/60)  BP(mean): --  RR: 18 (18 - 18)  SpO2: 98% (98% - 98%)    PHYSICAL EXAM:  GE: AAOX#, NAD  CardS: RRR, s1/s2+  Lungs: CTAB, non-labored   Ext: LUE open wound in extensor surface of forearm. Good granulation tissue. No surrounding erythema or drainage. Dressings change, wet to dry. distal pulses present.       I&O's Detail      LABS:                        12.3   6.37  )-----------( 353      ( 02 Feb 2017 06:50 )             35.9                 RADIOLOGY & ADDITIONAL STUDIES:

## 2017-02-03 NOTE — PROGRESS NOTE ADULT - ASSESSMENT
A 27 yo M POD# 4 s/p I&D of RUE abscess.   / Daily wet to dry dressing changes  / Vanco, increased frequency to q6h due to trough 8.9  / Psych and SBRT eval appreciated  / OT   / OOB

## 2017-02-04 ENCOUNTER — TRANSCRIPTION ENCOUNTER (OUTPATIENT)
Age: 27
End: 2017-02-04

## 2017-02-04 VITALS
SYSTOLIC BLOOD PRESSURE: 120 MMHG | DIASTOLIC BLOOD PRESSURE: 68 MMHG | TEMPERATURE: 98 F | OXYGEN SATURATION: 98 % | HEART RATE: 60 BPM | RESPIRATION RATE: 16 BRPM

## 2017-02-04 PROCEDURE — 87205 SMEAR GRAM STAIN: CPT

## 2017-02-04 PROCEDURE — 83735 ASSAY OF MAGNESIUM: CPT

## 2017-02-04 PROCEDURE — 99285 EMERGENCY DEPT VISIT HI MDM: CPT | Mod: 25

## 2017-02-04 PROCEDURE — 87070 CULTURE OTHR SPECIMN AEROBIC: CPT

## 2017-02-04 PROCEDURE — 10061 I&D ABSCESS COMP/MULTIPLE: CPT

## 2017-02-04 PROCEDURE — 87040 BLOOD CULTURE FOR BACTERIA: CPT

## 2017-02-04 PROCEDURE — 87075 CULTR BACTERIA EXCEPT BLOOD: CPT

## 2017-02-04 PROCEDURE — 86140 C-REACTIVE PROTEIN: CPT

## 2017-02-04 PROCEDURE — 97167 OT EVAL HIGH COMPLEX 60 MIN: CPT

## 2017-02-04 PROCEDURE — 85027 COMPLETE CBC AUTOMATED: CPT

## 2017-02-04 PROCEDURE — 87186 SC STD MICRODIL/AGAR DIL: CPT

## 2017-02-04 PROCEDURE — 84100 ASSAY OF PHOSPHORUS: CPT

## 2017-02-04 PROCEDURE — 80048 BASIC METABOLIC PNL TOTAL CA: CPT

## 2017-02-04 PROCEDURE — 96375 TX/PRO/DX INJ NEW DRUG ADDON: CPT | Mod: XU

## 2017-02-04 PROCEDURE — 36415 COLL VENOUS BLD VENIPUNCTURE: CPT

## 2017-02-04 PROCEDURE — 96374 THER/PROPH/DIAG INJ IV PUSH: CPT | Mod: XU

## 2017-02-04 PROCEDURE — 80202 ASSAY OF VANCOMYCIN: CPT

## 2017-02-04 PROCEDURE — 73201 CT UPPER EXTREMITY W/DYE: CPT

## 2017-02-04 PROCEDURE — 85610 PROTHROMBIN TIME: CPT

## 2017-02-04 PROCEDURE — 80053 COMPREHEN METABOLIC PANEL: CPT

## 2017-02-04 RX ORDER — HYDROMORPHONE HYDROCHLORIDE 2 MG/ML
0.5 INJECTION INTRAMUSCULAR; INTRAVENOUS; SUBCUTANEOUS ONCE
Qty: 0 | Refills: 0 | Status: DISCONTINUED | OUTPATIENT
Start: 2017-02-04 | End: 2017-02-04

## 2017-02-04 RX ORDER — OXYCODONE HYDROCHLORIDE 5 MG/1
1 TABLET ORAL
Qty: 12 | Refills: 0 | OUTPATIENT
Start: 2017-02-04

## 2017-02-04 RX ADMIN — HYDROMORPHONE HYDROCHLORIDE 0.5 MILLIGRAM(S): 2 INJECTION INTRAMUSCULAR; INTRAVENOUS; SUBCUTANEOUS at 10:47

## 2017-02-04 RX ADMIN — OXYCODONE HYDROCHLORIDE 5 MILLIGRAM(S): 5 TABLET ORAL at 11:25

## 2017-02-04 RX ADMIN — HYDROMORPHONE HYDROCHLORIDE 0.5 MILLIGRAM(S): 2 INJECTION INTRAMUSCULAR; INTRAVENOUS; SUBCUTANEOUS at 10:50

## 2017-02-04 RX ADMIN — HYDROMORPHONE HYDROCHLORIDE 1 MILLIGRAM(S): 2 INJECTION INTRAMUSCULAR; INTRAVENOUS; SUBCUTANEOUS at 11:22

## 2017-02-04 RX ADMIN — Medication 1 TABLET(S): at 09:57

## 2017-02-04 RX ADMIN — HYDROMORPHONE HYDROCHLORIDE 1 MILLIGRAM(S): 2 INJECTION INTRAMUSCULAR; INTRAVENOUS; SUBCUTANEOUS at 00:52

## 2017-02-04 RX ADMIN — HYDROMORPHONE HYDROCHLORIDE 1 MILLIGRAM(S): 2 INJECTION INTRAMUSCULAR; INTRAVENOUS; SUBCUTANEOUS at 05:25

## 2017-02-04 RX ADMIN — HYDROMORPHONE HYDROCHLORIDE 1 MILLIGRAM(S): 2 INJECTION INTRAMUSCULAR; INTRAVENOUS; SUBCUTANEOUS at 00:37

## 2017-02-04 RX ADMIN — HYDROMORPHONE HYDROCHLORIDE 1 MILLIGRAM(S): 2 INJECTION INTRAMUSCULAR; INTRAVENOUS; SUBCUTANEOUS at 05:40

## 2017-02-04 RX ADMIN — OXYCODONE HYDROCHLORIDE 5 MILLIGRAM(S): 5 TABLET ORAL at 12:16

## 2017-02-04 RX ADMIN — HYDROMORPHONE HYDROCHLORIDE 1 MILLIGRAM(S): 2 INJECTION INTRAMUSCULAR; INTRAVENOUS; SUBCUTANEOUS at 09:59

## 2017-02-04 RX ADMIN — Medication 100 MILLIGRAM(S): at 05:25

## 2017-02-04 NOTE — DISCHARGE NOTE ADULT - PLAN OF CARE
pain control, wound healing patient instructed on dressing changes, needs daily changes with wet to dry and sterile dressing on top  written for antibiotics must take full week prescribed   must follow up as outpatient in clinic  patient recommended to outpatient methadone clinic

## 2017-02-04 NOTE — DISCHARGE NOTE ADULT - CARE PLAN
Principal Discharge DX:	Abscess  Goal:	pain control, wound healing  Instructions for follow-up, activity and diet:	patient instructed on dressing changes, needs daily changes with wet to dry and sterile dressing on top  written for antibiotics must take full week prescribed   must follow up as outpatient in clinic  patient recommended to outpatient methadone clinic

## 2017-02-04 NOTE — DISCHARGE NOTE ADULT - PATIENT PORTAL LINK FT
“You can access the FollowHealth Patient Portal, offered by [facility], by registering with the following website: [email or web address]”

## 2017-02-04 NOTE — DISCHARGE NOTE ADULT - MEDICATION SUMMARY - MEDICATIONS TO TAKE
I will START or STAY ON the medications listed below when I get home from the hospital:    acetaminophen-oxyCODONE 325 mg-5 mg oral tablet  -- 1 tab(s) by mouth every 6 hours as needed for pain MDD:4  -- Caution federal law prohibits the transfer of this drug to any person other  than the person for whom it was prescribed.  May cause drowsiness.  Alcohol may intensify this effect.  Use care when operating dangerous machinery.  This prescription cannot be refilled.  This product contains acetaminophen.  Do not use  with any other product containing acetaminophen to prevent possible liver damage.  Using more of this medication than prescribed may cause serious breathing problems.    -- Indication: For pain    sulfamethoxazole-trimethoprim 800 mg-160 mg oral tablet  -- 1 tab(s) by mouth 2 times a day  -- Indication: For Antibiotic

## 2017-02-04 NOTE — DISCHARGE NOTE ADULT - HOSPITAL COURSE
25 y/o male with h/o drug abuse, came to the ER because of left upper ext. pain and swelling started about 5 days prior to admission on 1/29/17 which gradually became worse. fever and chills  no limitation in fingers movements. no paraesthesia. CT scan shows 7.8x5.3x4.3 cm abscess and myoscitis. I&D was offered to the patient earlier and was denied by the patient as per surgery resident. Patient eventually agreeable to I & D and taken to OR with ortho for I & D on 1/30 and then 1/30 with acs for I & D of abscess and infected hematoma. Given IV antibiotics and admitted too floor. Patient stable, no acute events. Tolerated diet, no fever chills or leukocytosis. Transitioned to PO antibiotics. Pain controlled on PO meds for dressing changes. Home care set up. Patient wants to perform dressing change himself so was instructed and supervised during dressing changed to prove understanding Patient successfully change dhis dressing and was stable and cleared for d/c to home. He will take an additional week cours eof PO antibiotics and perform daily dressing changed. Home care will visit every 3 days for wound checks and assistance .

## 2017-02-04 NOTE — DISCHARGE NOTE ADULT - CARE PROVIDER_API CALL
surgery, acute care  [address] [facility] [address]  Phone: [phone]  Fax: (   )    -

## 2017-02-04 NOTE — DISCHARGE NOTE ADULT - PROVIDER TOKENS
FREE:[LAST:[surgery],FIRST:[acute care],PHONE:[phone]],FAX:[(   [phone]    -],ADDRESS:[[address] [place] [address]]]

## 2017-02-06 ENCOUNTER — EMERGENCY (EMERGENCY)
Facility: HOSPITAL | Age: 27
LOS: 1 days | Discharge: HOME | End: 2017-02-06
Attending: EMERGENCY MEDICINE | Admitting: EMERGENCY MEDICINE
Payer: COMMERCIAL

## 2017-02-06 VITALS
DIASTOLIC BLOOD PRESSURE: 98 MMHG | TEMPERATURE: 98 F | OXYGEN SATURATION: 95 % | RESPIRATION RATE: 20 BRPM | WEIGHT: 229.94 LBS | SYSTOLIC BLOOD PRESSURE: 159 MMHG | HEIGHT: 76 IN | HEART RATE: 113 BPM

## 2017-02-06 DIAGNOSIS — L02.413 CUTANEOUS ABSCESS OF RIGHT UPPER LIMB: Chronic | ICD-10-CM

## 2017-02-06 PROCEDURE — 99053 MED SERV 10PM-8AM 24 HR FAC: CPT

## 2017-02-06 PROCEDURE — 99284 EMERGENCY DEPT VISIT MOD MDM: CPT | Mod: 25

## 2017-02-06 NOTE — ED ADULT TRIAGE NOTE - CHIEF COMPLAINT QUOTE
found unresponsive in br by scpd no medication given   arrives ambulatory to er with pd and ems changed

## 2017-02-07 LAB
ALBUMIN SERPL ELPH-MCNC: 3.7 G/DL — SIGNIFICANT CHANGE UP (ref 3.3–5.2)
ALP SERPL-CCNC: 48 U/L — SIGNIFICANT CHANGE UP (ref 40–120)
ALT FLD-CCNC: 40 U/L — SIGNIFICANT CHANGE UP
AMPHET UR-MCNC: NEGATIVE — SIGNIFICANT CHANGE UP
ANION GAP SERPL CALC-SCNC: 12 MMOL/L — SIGNIFICANT CHANGE UP (ref 5–17)
APAP SERPL-MCNC: <9.3 UG/ML — LOW (ref 10–26)
APPEARANCE UR: CLEAR — SIGNIFICANT CHANGE UP
AST SERPL-CCNC: 47 U/L — HIGH
BACTERIA # UR AUTO: ABNORMAL
BARBITURATES UR SCN-MCNC: NEGATIVE — SIGNIFICANT CHANGE UP
BASOPHILS # BLD AUTO: 0 K/UL — SIGNIFICANT CHANGE UP (ref 0–0.2)
BASOPHILS NFR BLD AUTO: 0.2 % — SIGNIFICANT CHANGE UP (ref 0–2)
BENZODIAZ UR-MCNC: NEGATIVE — SIGNIFICANT CHANGE UP
BILIRUB SERPL-MCNC: 0.2 MG/DL — LOW (ref 0.4–2)
BILIRUB UR-MCNC: NEGATIVE — SIGNIFICANT CHANGE UP
BUN SERPL-MCNC: 22 MG/DL — HIGH (ref 8–20)
CALCIUM SERPL-MCNC: 9.2 MG/DL — SIGNIFICANT CHANGE UP (ref 8.6–10.2)
CHLORIDE SERPL-SCNC: 92 MMOL/L — LOW (ref 98–107)
CO2 SERPL-SCNC: 26 MMOL/L — SIGNIFICANT CHANGE UP (ref 22–29)
COCAINE METAB.OTHER UR-MCNC: POSITIVE
COLOR SPEC: YELLOW — SIGNIFICANT CHANGE UP
CREAT SERPL-MCNC: 0.92 MG/DL — SIGNIFICANT CHANGE UP (ref 0.5–1.3)
DIFF PNL FLD: ABNORMAL
EOSINOPHIL # BLD AUTO: 0.2 K/UL — SIGNIFICANT CHANGE UP (ref 0–0.5)
EOSINOPHIL NFR BLD AUTO: 2 % — SIGNIFICANT CHANGE UP (ref 0–5)
EPI CELLS # UR: NEGATIVE — SIGNIFICANT CHANGE UP
ETHANOL SERPL-MCNC: <10 MG/DL — SIGNIFICANT CHANGE UP
GLUCOSE SERPL-MCNC: 111 MG/DL — SIGNIFICANT CHANGE UP (ref 70–115)
GLUCOSE UR QL: NEGATIVE MG/DL — SIGNIFICANT CHANGE UP
HCT VFR BLD CALC: 32.7 % — LOW (ref 42–52)
HGB BLD-MCNC: 11 G/DL — LOW (ref 14–18)
KETONES UR-MCNC: NEGATIVE — SIGNIFICANT CHANGE UP
LEUKOCYTE ESTERASE UR-ACNC: NEGATIVE — SIGNIFICANT CHANGE UP
LYMPHOCYTES # BLD AUTO: 1.4 K/UL — SIGNIFICANT CHANGE UP (ref 1–4.8)
LYMPHOCYTES # BLD AUTO: 13 % — LOW (ref 20–55)
MCHC RBC-ENTMCNC: 28.5 PG — SIGNIFICANT CHANGE UP (ref 27–31)
MCHC RBC-ENTMCNC: 33.6 G/DL — SIGNIFICANT CHANGE UP (ref 32–36)
MCV RBC AUTO: 84.7 FL — SIGNIFICANT CHANGE UP (ref 80–94)
METHADONE UR-MCNC: NEGATIVE — SIGNIFICANT CHANGE UP
MONOCYTES # BLD AUTO: 0.9 K/UL — HIGH (ref 0–0.8)
MONOCYTES NFR BLD AUTO: 8.5 % — SIGNIFICANT CHANGE UP (ref 3–10)
NEUTROPHILS # BLD AUTO: 7.9 K/UL — SIGNIFICANT CHANGE UP (ref 1.8–8)
NEUTROPHILS NFR BLD AUTO: 75.9 % — HIGH (ref 37–73)
NITRITE UR-MCNC: NEGATIVE — SIGNIFICANT CHANGE UP
OPIATES UR-MCNC: NEGATIVE — SIGNIFICANT CHANGE UP
PCP SPEC-MCNC: SIGNIFICANT CHANGE UP
PCP UR-MCNC: NEGATIVE — SIGNIFICANT CHANGE UP
PH UR: 6 — SIGNIFICANT CHANGE UP (ref 4.8–8)
PLATELET # BLD AUTO: 318 K/UL — SIGNIFICANT CHANGE UP (ref 150–400)
POTASSIUM SERPL-MCNC: 4 MMOL/L — SIGNIFICANT CHANGE UP (ref 3.5–5.3)
POTASSIUM SERPL-SCNC: 4 MMOL/L — SIGNIFICANT CHANGE UP (ref 3.5–5.3)
PROT SERPL-MCNC: 7.6 G/DL — SIGNIFICANT CHANGE UP (ref 6.6–8.7)
PROT UR-MCNC: 15 MG/DL
RBC # BLD: 3.86 M/UL — LOW (ref 4.6–6.2)
RBC # FLD: 13.8 % — SIGNIFICANT CHANGE UP (ref 11–15.6)
RBC CASTS # UR COMP ASSIST: SIGNIFICANT CHANGE UP /HPF (ref 0–4)
SALICYLATES SERPL-MCNC: <2 MG/DL — LOW (ref 10–20)
SODIUM SERPL-SCNC: 130 MMOL/L — LOW (ref 135–145)
SP GR SPEC: 1.02 — SIGNIFICANT CHANGE UP (ref 1.01–1.02)
THC UR QL: NEGATIVE — SIGNIFICANT CHANGE UP
UROBILINOGEN FLD QL: NEGATIVE MG/DL — SIGNIFICANT CHANGE UP
WBC # BLD: 10.43 K/UL — SIGNIFICANT CHANGE UP (ref 4.8–10.8)
WBC # FLD AUTO: 10.43 K/UL — SIGNIFICANT CHANGE UP (ref 4.8–10.8)
WBC UR QL: SIGNIFICANT CHANGE UP

## 2017-02-07 PROCEDURE — 80307 DRUG TEST PRSMV CHEM ANLYZR: CPT

## 2017-02-07 PROCEDURE — 80053 COMPREHEN METABOLIC PANEL: CPT

## 2017-02-07 PROCEDURE — 81001 URINALYSIS AUTO W/SCOPE: CPT

## 2017-02-07 PROCEDURE — 99283 EMERGENCY DEPT VISIT LOW MDM: CPT | Mod: 25

## 2017-02-07 PROCEDURE — 85027 COMPLETE CBC AUTOMATED: CPT

## 2017-02-07 NOTE — ED ADULT NURSE NOTE - OBJECTIVE STATEMENT
Assumed pt care @ 2400. Pt received in yellow gown with belongings not on stretcher and no IV. "Pt states he knows why he is here but he doesn't want to talk about it" Pt appears anxious. As per triage not he was found unresponsive in a bathroom and not given any meds. Pt has multiple wounds to his arms. Pt denies drug use. 4 cm deep laceration noted to the lateral aspect of the left arm, clean and dry. Pt will not discuss how it happened.

## 2017-02-07 NOTE — ED PROVIDER NOTE - OBJECTIVE STATEMENT
A 26 year old male pt presents to the ED s/p Percocet OD. Pt was discharged last week s/p admission secondary to wound on his left arm. Pt was discharged with Bactrim and Percocet, and the pt reports taking 15 Percocet today around 8 PM. He denies receiving Narcan. He does have a hx of heroin abuse and reports last using 2 weeks ago. Pt denies any suicidal ideations.  No further complaints at this time.

## 2017-02-07 NOTE — ED PROVIDER NOTE - NS ED MD SCRIBE ATTENDING SCRIBE SECTIONS
VITAL SIGNS( Pullset)/REVIEW OF SYSTEMS/DISPOSITION/HIV/PHYSICAL EXAM/HISTORY OF PRESENT ILLNESS/PAST MEDICAL/SURGICAL/SOCIAL HISTORY

## 2017-02-07 NOTE — ED ADULT NURSE REASSESSMENT NOTE - NS ED NURSE REASSESS COMMENT FT1
pt arrives awake alert oriented states he only took percocet tonight as per ems scpd had to break into bathroom to get him.  pt cooperative changed into gown belongings secured valiuables to safe
Wet to dry dressing applied to left arm. Swelling noted to the arm around lac, denies pain.

## 2017-02-23 ENCOUNTER — APPOINTMENT (OUTPATIENT)
Dept: TRAUMA SURGERY | Facility: CLINIC | Age: 27
End: 2017-02-23

## 2017-06-15 ENCOUNTER — INPATIENT (INPATIENT)
Facility: HOSPITAL | Age: 27
LOS: 0 days | Discharge: HOME | DRG: 465 | End: 2017-06-15
Attending: SURGERY | Admitting: SURGERY
Payer: COMMERCIAL

## 2017-06-15 ENCOUNTER — TRANSCRIPTION ENCOUNTER (OUTPATIENT)
Age: 27
End: 2017-06-15

## 2017-06-15 ENCOUNTER — RESULT REVIEW (OUTPATIENT)
Age: 27
End: 2017-06-15

## 2017-06-15 VITALS
OXYGEN SATURATION: 99 % | SYSTOLIC BLOOD PRESSURE: 104 MMHG | DIASTOLIC BLOOD PRESSURE: 78 MMHG | TEMPERATURE: 98 F | RESPIRATION RATE: 20 BRPM | HEART RATE: 54 BPM

## 2017-06-15 VITALS
TEMPERATURE: 98 F | RESPIRATION RATE: 20 BRPM | HEIGHT: 76 IN | WEIGHT: 220.02 LBS | DIASTOLIC BLOOD PRESSURE: 83 MMHG | OXYGEN SATURATION: 98 % | HEART RATE: 75 BPM | SYSTOLIC BLOOD PRESSURE: 129 MMHG

## 2017-06-15 DIAGNOSIS — L02.413 CUTANEOUS ABSCESS OF RIGHT UPPER LIMB: Chronic | ICD-10-CM

## 2017-06-15 DIAGNOSIS — M79.5 RESIDUAL FOREIGN BODY IN SOFT TISSUE: ICD-10-CM

## 2017-06-15 LAB
ABO RH CONFIRMATION: SIGNIFICANT CHANGE UP
ALBUMIN SERPL ELPH-MCNC: 4.6 G/DL — SIGNIFICANT CHANGE UP (ref 3.3–5.2)
ALP SERPL-CCNC: 64 U/L — SIGNIFICANT CHANGE UP (ref 40–120)
ALT FLD-CCNC: 13 U/L — SIGNIFICANT CHANGE UP
AMPHET UR-MCNC: NEGATIVE — SIGNIFICANT CHANGE UP
ANION GAP SERPL CALC-SCNC: 18 MMOL/L — HIGH (ref 5–17)
APTT BLD: 34.7 SEC — SIGNIFICANT CHANGE UP (ref 27.5–37.4)
AST SERPL-CCNC: 15 U/L — SIGNIFICANT CHANGE UP
BARBITURATES UR SCN-MCNC: NEGATIVE — SIGNIFICANT CHANGE UP
BASOPHILS # BLD AUTO: 0 K/UL — SIGNIFICANT CHANGE UP (ref 0–0.2)
BASOPHILS NFR BLD AUTO: 0.4 % — SIGNIFICANT CHANGE UP (ref 0–2)
BENZODIAZ UR-MCNC: NEGATIVE — SIGNIFICANT CHANGE UP
BILIRUB SERPL-MCNC: 0.7 MG/DL — SIGNIFICANT CHANGE UP (ref 0.4–2)
BLD GP AB SCN SERPL QL: SIGNIFICANT CHANGE UP
BUN SERPL-MCNC: 10 MG/DL — SIGNIFICANT CHANGE UP (ref 8–20)
CALCIUM SERPL-MCNC: 9.8 MG/DL — SIGNIFICANT CHANGE UP (ref 8.6–10.2)
CHLORIDE SERPL-SCNC: 100 MMOL/L — SIGNIFICANT CHANGE UP (ref 98–107)
CO2 SERPL-SCNC: 25 MMOL/L — SIGNIFICANT CHANGE UP (ref 22–29)
COCAINE METAB.OTHER UR-MCNC: NEGATIVE — SIGNIFICANT CHANGE UP
CREAT SERPL-MCNC: 0.85 MG/DL — SIGNIFICANT CHANGE UP (ref 0.5–1.3)
EOSINOPHIL # BLD AUTO: 0.1 K/UL — SIGNIFICANT CHANGE UP (ref 0–0.5)
EOSINOPHIL NFR BLD AUTO: 1 % — SIGNIFICANT CHANGE UP (ref 0–5)
GLUCOSE SERPL-MCNC: 104 MG/DL — SIGNIFICANT CHANGE UP (ref 70–115)
HCT VFR BLD CALC: 40.9 % — LOW (ref 42–52)
HGB BLD-MCNC: 14.1 G/DL — SIGNIFICANT CHANGE UP (ref 14–18)
INR BLD: 1.14 RATIO — SIGNIFICANT CHANGE UP (ref 0.88–1.16)
LYMPHOCYTES # BLD AUTO: 2 K/UL — SIGNIFICANT CHANGE UP (ref 1–4.8)
LYMPHOCYTES # BLD AUTO: 25.8 % — SIGNIFICANT CHANGE UP (ref 20–55)
MCHC RBC-ENTMCNC: 29.1 PG — SIGNIFICANT CHANGE UP (ref 27–31)
MCHC RBC-ENTMCNC: 34.5 G/DL — SIGNIFICANT CHANGE UP (ref 32–36)
MCV RBC AUTO: 84.5 FL — SIGNIFICANT CHANGE UP (ref 80–94)
METHADONE UR-MCNC: NEGATIVE — SIGNIFICANT CHANGE UP
MONOCYTES # BLD AUTO: 0.6 K/UL — SIGNIFICANT CHANGE UP (ref 0–0.8)
MONOCYTES NFR BLD AUTO: 7.5 % — SIGNIFICANT CHANGE UP (ref 3–10)
NEUTROPHILS # BLD AUTO: 5 K/UL — SIGNIFICANT CHANGE UP (ref 1.8–8)
NEUTROPHILS NFR BLD AUTO: 65 % — SIGNIFICANT CHANGE UP (ref 37–73)
OPIATES UR-MCNC: POSITIVE
PCP SPEC-MCNC: SIGNIFICANT CHANGE UP
PCP UR-MCNC: NEGATIVE — SIGNIFICANT CHANGE UP
PLATELET # BLD AUTO: 291 K/UL — SIGNIFICANT CHANGE UP (ref 150–400)
POTASSIUM SERPL-MCNC: 4.1 MMOL/L — SIGNIFICANT CHANGE UP (ref 3.5–5.3)
POTASSIUM SERPL-SCNC: 4.1 MMOL/L — SIGNIFICANT CHANGE UP (ref 3.5–5.3)
PROT SERPL-MCNC: 8.2 G/DL — SIGNIFICANT CHANGE UP (ref 6.6–8.7)
PROTHROM AB SERPL-ACNC: 12.6 SEC — SIGNIFICANT CHANGE UP (ref 9.8–12.7)
RBC # BLD: 4.84 M/UL — SIGNIFICANT CHANGE UP (ref 4.6–6.2)
RBC # FLD: 13.3 % — SIGNIFICANT CHANGE UP (ref 11–15.6)
SODIUM SERPL-SCNC: 143 MMOL/L — SIGNIFICANT CHANGE UP (ref 135–145)
THC UR QL: NEGATIVE — SIGNIFICANT CHANGE UP
TYPE + AB SCN PNL BLD: SIGNIFICANT CHANGE UP
WBC # BLD: 7.8 K/UL — SIGNIFICANT CHANGE UP (ref 4.8–10.8)
WBC # FLD AUTO: 7.8 K/UL — SIGNIFICANT CHANGE UP (ref 4.8–10.8)

## 2017-06-15 PROCEDURE — 73080 X-RAY EXAM OF ELBOW: CPT | Mod: 26,RT

## 2017-06-15 PROCEDURE — 99283 EMERGENCY DEPT VISIT LOW MDM: CPT | Mod: 25

## 2017-06-15 PROCEDURE — 88300 SURGICAL PATH GROSS: CPT | Mod: 26

## 2017-06-15 PROCEDURE — 10121 INC&RMVL FB SUBQ TISS COMP: CPT

## 2017-06-15 RX ORDER — ENOXAPARIN SODIUM 100 MG/ML
40 INJECTION SUBCUTANEOUS EVERY 24 HOURS
Qty: 0 | Refills: 0 | Status: DISCONTINUED | OUTPATIENT
Start: 2017-06-15 | End: 2017-06-15

## 2017-06-15 RX ORDER — CEFAZOLIN SODIUM 1 G
2000 VIAL (EA) INJECTION EVERY 8 HOURS
Qty: 0 | Refills: 0 | Status: DISCONTINUED | OUTPATIENT
Start: 2017-06-15 | End: 2017-06-15

## 2017-06-15 RX ORDER — CEPHALEXIN 500 MG
500 CAPSULE ORAL ONCE
Qty: 0 | Refills: 0 | Status: COMPLETED | OUTPATIENT
Start: 2017-06-15 | End: 2017-06-15

## 2017-06-15 RX ORDER — CEFAZOLIN SODIUM 1 G
VIAL (EA) INJECTION
Qty: 0 | Refills: 0 | Status: DISCONTINUED | OUTPATIENT
Start: 2017-06-15 | End: 2017-06-15

## 2017-06-15 RX ORDER — ONDANSETRON 8 MG/1
4 TABLET, FILM COATED ORAL ONCE
Qty: 0 | Refills: 0 | Status: DISCONTINUED | OUTPATIENT
Start: 2017-06-15 | End: 2017-06-15

## 2017-06-15 RX ORDER — ACETAMINOPHEN 500 MG
650 TABLET ORAL ONCE
Qty: 0 | Refills: 0 | Status: DISCONTINUED | OUTPATIENT
Start: 2017-06-15 | End: 2017-06-15

## 2017-06-15 RX ORDER — SODIUM CHLORIDE 9 MG/ML
1000 INJECTION, SOLUTION INTRAVENOUS
Qty: 0 | Refills: 0 | Status: DISCONTINUED | OUTPATIENT
Start: 2017-06-15 | End: 2017-06-15

## 2017-06-15 RX ORDER — MORPHINE SULFATE 50 MG/1
2 CAPSULE, EXTENDED RELEASE ORAL EVERY 4 HOURS
Qty: 0 | Refills: 0 | Status: DISCONTINUED | OUTPATIENT
Start: 2017-06-15 | End: 2017-06-15

## 2017-06-15 RX ORDER — CLONAZEPAM 1 MG
1 TABLET ORAL ONCE
Qty: 0 | Refills: 0 | Status: DISCONTINUED | OUTPATIENT
Start: 2017-06-15 | End: 2017-06-15

## 2017-06-15 RX ORDER — CEFAZOLIN SODIUM 1 G
2000 VIAL (EA) INJECTION ONCE
Qty: 0 | Refills: 0 | Status: COMPLETED | OUTPATIENT
Start: 2017-06-15 | End: 2017-06-15

## 2017-06-15 RX ORDER — ONDANSETRON 8 MG/1
4 TABLET, FILM COATED ORAL EVERY 6 HOURS
Qty: 0 | Refills: 0 | Status: DISCONTINUED | OUTPATIENT
Start: 2017-06-15 | End: 2017-06-15

## 2017-06-15 RX ORDER — HYDROMORPHONE HYDROCHLORIDE 2 MG/ML
1 INJECTION INTRAMUSCULAR; INTRAVENOUS; SUBCUTANEOUS
Qty: 0 | Refills: 0 | Status: DISCONTINUED | OUTPATIENT
Start: 2017-06-15 | End: 2017-06-15

## 2017-06-15 RX ORDER — SODIUM CHLORIDE 9 MG/ML
3 INJECTION INTRAMUSCULAR; INTRAVENOUS; SUBCUTANEOUS ONCE
Qty: 0 | Refills: 0 | Status: COMPLETED | OUTPATIENT
Start: 2017-06-15 | End: 2017-06-15

## 2017-06-15 RX ADMIN — SODIUM CHLORIDE 3 MILLILITER(S): 9 INJECTION INTRAMUSCULAR; INTRAVENOUS; SUBCUTANEOUS at 13:28

## 2017-06-15 RX ADMIN — HYDROMORPHONE HYDROCHLORIDE 1 MILLIGRAM(S): 2 INJECTION INTRAMUSCULAR; INTRAVENOUS; SUBCUTANEOUS at 19:45

## 2017-06-15 RX ADMIN — Medication 500 MILLIGRAM(S): at 07:40

## 2017-06-15 RX ADMIN — HYDROMORPHONE HYDROCHLORIDE 1 MILLIGRAM(S): 2 INJECTION INTRAMUSCULAR; INTRAVENOUS; SUBCUTANEOUS at 19:35

## 2017-06-15 RX ADMIN — Medication 100 MILLIGRAM(S): at 14:42

## 2017-06-15 RX ADMIN — MORPHINE SULFATE 2 MILLIGRAM(S): 50 CAPSULE, EXTENDED RELEASE ORAL at 14:10

## 2017-06-15 NOTE — ED ADULT NURSE REASSESSMENT NOTE - CONDITION
unchanged/Report given to VARGHESE CHEN pt transported and tranferred to [facility] vitals, no c/o sob , chest pain  awaiting OR

## 2017-06-15 NOTE — H&P ADULT - HISTORY OF PRESENT ILLNESS
26 year old male IV drug user presents to the ED complaining of right forearm pain after IV drug injection. The patient is well known to the ACS service and has had previous explorations and drainage procedures for the this exact presentation. He denies fevers, chills, nausea, and vomiting

## 2017-06-15 NOTE — ED STATDOCS - ATTENDING CONTRIBUTION TO CARE
I, Mickey Issa, performed the initial face to face bedside interview with this patient regarding history of present illness, review of symptoms and relevant past medical, social and family history.  I completed an independent physical examination.  I was the initial provider who evaluated this patient. I have signed out the follow up of any pending tests (i.e. labs, radiological studies) to the ACP.  I have communicated the patient’s plan of care and disposition with the ACP.  The history, relevant review of systems, past medical and surgical history, medical decision making, and physical examination was documented by the scribe in my presence and I attest to the accuracy of the documentation.

## 2017-06-15 NOTE — BRIEF OPERATIVE NOTE - PROCEDURE
Foreign body removal  06/15/2017    Active  [id number]  Excisional debridement  06/15/2017    Active  [id number]

## 2017-06-15 NOTE — ED STATDOCS - NS ED MD SCRIBE ATTENDING SCRIBE SECTIONS
REVIEW OF SYSTEMS/INTAKE ASSESSMENT/SCREENINGS/PAST MEDICAL/SURGICAL/SOCIAL HISTORY/HIV/VITAL SIGNS( Pullset)/HISTORY OF PRESENT ILLNESS/PHYSICAL EXAM/DISPOSITION

## 2017-06-15 NOTE — DISCHARGE NOTE ADULT - PLAN OF CARE
Return to regular diet and activity as tolerated. BATHING: Please do not submerge wound underwater. You may shower and/or sponge bathe.   ACTIVITY: No heavy lifting or straining. Otherwise, you may return to your usual level of physical activity. If you are taking narcotic pain medication (such as Percocet) DO NOT drive a car, operate machinery or make important decisions.  DIET: Return to your usual diet.  NOTIFY YOUR SURGEON IF: You have any bleeding that does not stop, any pus draining from your wound(s), any fever (over 100.4 F) or chills, persistent nausea/vomiting, persistent diarrhea, or if your pain is not controlled on your discharge pain medications.  FOLLOW-UP: Please follow up with your primary care physician and Acute Care Surgery clinic [phone] in 10-14 days regarding your hospitalization. Call for appointment upon discharge.

## 2017-06-15 NOTE — SBIRT NOTE. - NSSBIRTSERVICES_GEN_A_ED_FT
Naloxone Rescue Kit dispensed: Pt was educated about Naloxone and trained on how to assemble and utilize the kit.  Provided SBIRT services: Full screen positive. Referral to Treatment Performed. Screening results were reviewed with the patient and patient was provided information about healthy guidelines and potential negative consequences associated with level of risk. Motivation and readiness to reduce or stop use was discussed and goals and activities to make changes were suggested/offered.  Referral for complete assessment and level of care determination at a certified treatment facility was completed by giving the patient information for treatment facilities that met their needs and encouraging them to call for an appointment. A call was not made to a facility because  Patient not interested at this time  Audit Score:5  DAST Score:8  Duration = 75 Minutes

## 2017-06-15 NOTE — DISCHARGE NOTE ADULT - PATIENT PORTAL LINK FT
“You can access the FollowHealth Patient Portal, offered by [facility], by registering with the following website: [email or web address]”

## 2017-06-15 NOTE — DISCHARGE NOTE ADULT - CARE PLAN
Principal Discharge DX:	Foreign body (FB) in soft tissue  Goal:	Return to regular diet and activity as tolerated.  Instructions for follow-up, activity and diet:	BATHING: Please do not submerge wound underwater. You may shower and/or sponge bathe.   ACTIVITY: No heavy lifting or straining. Otherwise, you may return to your usual level of physical activity. If you are taking narcotic pain medication (such as Percocet) DO NOT drive a car, operate machinery or make important decisions.  DIET: Return to your usual diet.  NOTIFY YOUR SURGEON IF: You have any bleeding that does not stop, any pus draining from your wound(s), any fever (over 100.4 F) or chills, persistent nausea/vomiting, persistent diarrhea, or if your pain is not controlled on your discharge pain medications.  FOLLOW-UP: Please follow up with your primary care physician and Acute Care Surgery clinic [phone] in 10-14 days regarding your hospitalization. Call for appointment upon discharge. Principal Discharge DX:	Foreign body (FB) in soft tissue  Goal:	Return to regular diet and activity as tolerated.  Instructions for follow-up, activity and diet:	BATHING: Please do not submerge wound underwater. You may shower and/or sponge bathe.   ACTIVITY: No heavy lifting or straining. Otherwise, you may return to your usual level of physical activity. If you are taking narcotic pain medication (such as Percocet) DO NOT drive a car, operate machinery or make important decisions.  DIET: Return to your usual diet.  NOTIFY YOUR SURGEON IF: You have any bleeding that does not stop, any pus draining from your wound(s), any fever (over 100.4 F) or chills, persistent nausea/vomiting, persistent diarrhea, or if your pain is not controlled on your discharge pain medications.  FOLLOW-UP: Please follow up with your primary care physician and Acute Care Surgery clinic [phone] in 10-14 days regarding your hospitalization. Call for appointment upon discharge.

## 2017-06-15 NOTE — ED STATDOCS - PROGRESS NOTE DETAILS
NP NOTE: Pt seen by intake physician and HPI/ROS/PE/MDM reviewed. Pt seen and evaluated. Discussed plan and any resulted studies at this time. Will continue to monitor and re-evaluate.  Re-Evaluation: pt awaiting surgery consult to [facility] FB spoke with surgical resident after eval of patient, suspects pt to go to OR, waiting for eval by attending. will admit to [facility]

## 2017-06-15 NOTE — DISCHARGE NOTE ADULT - HOSPITAL COURSE
26 year old male IV drug user presents to the ED complaining of right forearm pain after IV drug injection. The patient is well known to the ACS service and has had previous explorations and drainage procedures for the this exact presentation. He denies fevers, chills, nausea, and vomiting.    Admitted and taken to operating room for excisional debridement of foreign body.  Tolerated procedure well without complications.  Patient tolerating diet and hemodynamically stable in PACU, cleared for dc to home.

## 2017-06-15 NOTE — ED STATDOCS - OBJECTIVE STATEMENT
25 yo M smoker with hx of IVDA presents to ED c/o foreign body to right forearm. Pt states the tip of a needle broke off into right forearm 2 days ago while shooting heroin. He denies pain and motor/sensory deficits. Last tetanus was 2-3 years ago. Pt denies medical hx. He denies any other injuries.

## 2017-06-15 NOTE — DISCHARGE NOTE ADULT - CARE PROVIDER_API CALL
Acute Care Surgery,   [address] [facility]  [address] [place] [address]  Phone: [phone]  Fax: (   )    -

## 2017-06-15 NOTE — DISCHARGE NOTE ADULT - PROVIDER TOKENS
FREE:[LAST:[Acute Care Surgery],PHONE:[phone]],FAX:[(   [phone]    -],ADDRESS:[[address] [place] [address]]]

## 2017-06-15 NOTE — H&P ADULT - NSHPLABSRESULTS_GEN_ALL_CORE
INTERVAL HPI/OVERNIGHT EVENTS:    SUBJECTIVE:      MEDICATIONS  (STANDING):  sodium chloride 0.9% lock flush 3milliLiter(s) IV Push once  enoxaparin Injectable 40milliGRAM(s) SubCutaneous every 24 hours  lactated ringers. 1000milliLiter(s) IV Continuous <Continuous>  ceFAZolin   IVPB  IV Intermittent     MEDICATIONS  (PRN):  acetaminophen   Tablet. 650milliGRAM(s) Oral once PRN Mild Pain (1 - 3)  ondansetron Injectable 4milliGRAM(s) IV Push once PRN Nausea and/or Vomiting  clonazePAM Tablet 1milliGRAM(s) Oral once PRN agitation  ondansetron Injectable 4milliGRAM(s) IV Push every 6 hours PRN Nausea  morphine  - Injectable 2milliGRAM(s) IV Push every 4 hours PRN Moderate Pain (4 - 6)      Vital Signs Last 24 Hrs  T(C): 36.2, Max: 36.7 (06-15 @ 06:27)  T(F): 97.2, Max: 98.1 (06-15 @ 06:27)  HR: 55 (55 - 75)  BP: 135/82 (129/83 - 135/82)  BP(mean): --  RR: 18 (18 - 20)  SpO2: 100% (98% - 100%)    PE  Gen:  Pulm:  CV:  Abd:  :  Ext: 5/5 upper and lower bilaterally , left arm with prior I&D scars well healed, RUE with   Vasc: 2+ radial pulses  Neuro: GCS15       I&O's Detail      LABS:                        14.1   7.8   )-----------( 291      ( 15 Jose A 2017 09:56 )             40.9     06-15    143  |  100  |  10.0  ----------------------------<  104  4.1   |  25.0  |  0.85    Ca    9.8      15 Jose A 2017 09:56    TPro  8.2  /  Alb  4.6  /  TBili  0.7  /  DBili  x   /  AST  15  /  ALT  13  /  AlkPhos  64  06-15    PT/INR - ( 15 Jose A 2017 09:56 )   PT: 12.6 sec;   INR: 1.14 ratio         PTT - ( 15 Jose A 2017 09:56 )  PTT:34.7 sec      RADIOLOGY & ADDITIONAL STUDIES: Xray of right elbow --> foreign body presumed needle tip located in soft tissue medial aspect of proximal forearm LABS:                        14.1   7.8   )-----------( 291      ( 15 Jose A 2017 09:56 )             40.9     06-15    143  |  100  |  10.0  ----------------------------<  104  4.1   |  25.0  |  0.85    Ca    9.8      15 Jose A 2017 09:56    TPro  8.2  /  Alb  4.6  /  TBili  0.7  /  DBili  x   /  AST  15  /  ALT  13  /  AlkPhos  64  06-15    PT/INR - ( 15 Jose A 2017 09:56 )   PT: 12.6 sec;   INR: 1.14 ratio         PTT - ( 15 Jose A 2017 09:56 )  PTT:34.7 sec      RADIOLOGY & ADDITIONAL STUDIES: Xray of right elbow --> foreign body presumed needle tip located in soft tissue medial aspect of proximal forearm

## 2017-06-15 NOTE — ED ADULT NURSE NOTE - OBJECTIVE STATEMENT
27y/o male c/o broken needle in right AC. Pt afebrile at this time, minimal swelling noted. will continue to monitor

## 2017-06-15 NOTE — DISCHARGE NOTE ADULT - NS AS ACTIVITY OBS
Walking-Indoors allowed/Do not make important decisions/Return to Work/School allowed/Walking-Outdoors allowed/Showering allowed/Stairs allowed

## 2017-06-15 NOTE — H&P ADULT - NSHPPHYSICALEXAM_GEN_ALL_CORE
Vital Signs Last 24 Hrs  T(C): 36.2, Max: 36.7 (06-15 @ 06:27)  T(F): 97.2, Max: 98.1 (06-15 @ 06:27)  HR: 55 (55 - 75)  BP: 135/82 (129/83 - 135/82)  BP(mean): --  RR: 18 (18 - 20)  SpO2: 100% (98% - 100%)    PE  Gen: NAD, alert and oriented x 3  Pulm: nonlabored respirations  CV: RRR  Abd: soft, NTND  Ext: 5/5 upper and lower bilaterally , left arm with prior I&D scars well healed, RUE with   Vasc: 2+ radial pulses  Neuro: GCS15

## 2017-06-19 LAB — SURGICAL PATHOLOGY FINAL REPORT - CH: SIGNIFICANT CHANGE UP

## 2017-09-14 NOTE — ED ADULT NURSE NOTE - FALLEN IN THE PAST
Keyonna is here for a medication recheck.     Pre-Visit Screening :  Immunizations : up to date    Colonoscopy : Declined  Mammogram : is up to date  Asthma Action Test/Plan : NA  PHQ9/GAD7 :  NA      Pulse - regular  My Chart - accepts    CLASSIFICATION OF OVERWEIGHT AND OBESITY BY BMI                         Obesity Class           BMI(kg/m2)  Underweight                                    < 18.5  Normal                                         18.5-24.9  Overweight                                     25.0-29.9  OBESITY                     I                  30.0-34.9                              II                 35.0-39.9  EXTREME OBESITY             III                >40                             Patient's  BMI Body mass index is 33.06 kg/(m^2).  http://hin.nhlbi.nih.gov/menuplanner/menu.cgi  Questioned patient about current smoking habits.  Pt. quit smoking some time ago.    MONIQUE Todd ([facility])    
no

## 2017-12-15 PROCEDURE — 86900 BLOOD TYPING SEROLOGIC ABO: CPT

## 2017-12-15 PROCEDURE — 99285 EMERGENCY DEPT VISIT HI MDM: CPT | Mod: 25

## 2017-12-15 PROCEDURE — 88300 SURGICAL PATH GROSS: CPT

## 2017-12-15 PROCEDURE — 86901 BLOOD TYPING SEROLOGIC RH(D): CPT

## 2017-12-15 PROCEDURE — 85027 COMPLETE CBC AUTOMATED: CPT

## 2017-12-15 PROCEDURE — 80307 DRUG TEST PRSMV CHEM ANLYZR: CPT

## 2017-12-15 PROCEDURE — 85730 THROMBOPLASTIN TIME PARTIAL: CPT

## 2017-12-15 PROCEDURE — 85610 PROTHROMBIN TIME: CPT

## 2017-12-15 PROCEDURE — 80053 COMPREHEN METABOLIC PANEL: CPT

## 2017-12-15 PROCEDURE — 73080 X-RAY EXAM OF ELBOW: CPT

## 2017-12-15 PROCEDURE — 86850 RBC ANTIBODY SCREEN: CPT

## 2017-12-26 NOTE — DISCHARGE NOTE ADULT - DO YOU HAVE DIFFICULTY CLIMBING STAIRS
Spoke to patient gave information that stress test was negative.  Patient will follow up in one year with Dr Stubbs 10/2018.  
No

## 2018-04-13 ENCOUNTER — INPATIENT (INPATIENT)
Facility: HOSPITAL | Age: 28
LOS: 2 days | Discharge: HOME | DRG: 603 | End: 2018-04-16
Attending: HOSPITALIST | Admitting: HOSPITALIST
Payer: MEDICAID

## 2018-04-13 VITALS — HEIGHT: 76 IN | WEIGHT: 220.02 LBS

## 2018-04-13 DIAGNOSIS — F11.10 OPIOID ABUSE, UNCOMPLICATED: ICD-10-CM

## 2018-04-13 DIAGNOSIS — L02.413 CUTANEOUS ABSCESS OF RIGHT UPPER LIMB: Chronic | ICD-10-CM

## 2018-04-13 DIAGNOSIS — F14.10 COCAINE ABUSE, UNCOMPLICATED: ICD-10-CM

## 2018-04-13 DIAGNOSIS — F17.200 NICOTINE DEPENDENCE, UNSPECIFIED, UNCOMPLICATED: ICD-10-CM

## 2018-04-13 DIAGNOSIS — L02.413 CUTANEOUS ABSCESS OF RIGHT UPPER LIMB: ICD-10-CM

## 2018-04-13 LAB
ALBUMIN SERPL ELPH-MCNC: 4.1 G/DL — SIGNIFICANT CHANGE UP (ref 3.3–5.2)
ALP SERPL-CCNC: 54 U/L — SIGNIFICANT CHANGE UP (ref 40–120)
ALT FLD-CCNC: 13 U/L — SIGNIFICANT CHANGE UP
ANION GAP SERPL CALC-SCNC: 14 MMOL/L — SIGNIFICANT CHANGE UP (ref 5–17)
AST SERPL-CCNC: 14 U/L — SIGNIFICANT CHANGE UP
BILIRUB SERPL-MCNC: 0.6 MG/DL — SIGNIFICANT CHANGE UP (ref 0.4–2)
BLD GP AB SCN SERPL QL: SIGNIFICANT CHANGE UP
BUN SERPL-MCNC: 13 MG/DL — SIGNIFICANT CHANGE UP (ref 8–20)
CALCIUM SERPL-MCNC: 9.6 MG/DL — SIGNIFICANT CHANGE UP (ref 8.6–10.2)
CHLORIDE SERPL-SCNC: 97 MMOL/L — LOW (ref 98–107)
CO2 SERPL-SCNC: 26 MMOL/L — SIGNIFICANT CHANGE UP (ref 22–29)
CREAT SERPL-MCNC: 0.94 MG/DL — SIGNIFICANT CHANGE UP (ref 0.5–1.3)
GLUCOSE SERPL-MCNC: 103 MG/DL — SIGNIFICANT CHANGE UP (ref 70–115)
HCT VFR BLD CALC: 41.2 % — LOW (ref 42–52)
HGB BLD-MCNC: 14.5 G/DL — SIGNIFICANT CHANGE UP (ref 14–18)
HIV 1 & 2 AB SERPL IA.RAPID: SIGNIFICANT CHANGE UP
INR BLD: 1.28 RATIO — HIGH (ref 0.88–1.16)
MCHC RBC-ENTMCNC: 29.8 PG — SIGNIFICANT CHANGE UP (ref 27–31)
MCHC RBC-ENTMCNC: 35.2 G/DL — SIGNIFICANT CHANGE UP (ref 32–36)
MCV RBC AUTO: 84.6 FL — SIGNIFICANT CHANGE UP (ref 80–94)
PLATELET # BLD AUTO: 242 K/UL — SIGNIFICANT CHANGE UP (ref 150–400)
POTASSIUM SERPL-MCNC: 4.7 MMOL/L — SIGNIFICANT CHANGE UP (ref 3.5–5.3)
POTASSIUM SERPL-SCNC: 4.7 MMOL/L — SIGNIFICANT CHANGE UP (ref 3.5–5.3)
PROT SERPL-MCNC: 8.2 G/DL — SIGNIFICANT CHANGE UP (ref 6.6–8.7)
PROTHROM AB SERPL-ACNC: 14.1 SEC — HIGH (ref 9.8–12.7)
RBC # BLD: 4.87 M/UL — SIGNIFICANT CHANGE UP (ref 4.6–6.2)
RBC # FLD: 12.8 % — SIGNIFICANT CHANGE UP (ref 11–15.6)
SODIUM SERPL-SCNC: 137 MMOL/L — SIGNIFICANT CHANGE UP (ref 135–145)
TYPE + AB SCN PNL BLD: SIGNIFICANT CHANGE UP
WBC # BLD: 13.5 K/UL — HIGH (ref 4.8–10.8)
WBC # FLD AUTO: 13.5 K/UL — HIGH (ref 4.8–10.8)

## 2018-04-13 PROCEDURE — 73090 X-RAY EXAM OF FOREARM: CPT | Mod: 26,LT

## 2018-04-13 PROCEDURE — 99223 1ST HOSP IP/OBS HIGH 75: CPT

## 2018-04-13 PROCEDURE — 99284 EMERGENCY DEPT VISIT MOD MDM: CPT

## 2018-04-13 PROCEDURE — 76882 US LMTD JT/FCL EVL NVASC XTR: CPT | Mod: 26,RT

## 2018-04-13 PROCEDURE — 71045 X-RAY EXAM CHEST 1 VIEW: CPT | Mod: 26

## 2018-04-13 RX ORDER — AMPICILLIN SODIUM AND SULBACTAM SODIUM 250; 125 MG/ML; MG/ML
INJECTION, POWDER, FOR SUSPENSION INTRAMUSCULAR; INTRAVENOUS
Qty: 0 | Refills: 0 | Status: DISCONTINUED | OUTPATIENT
Start: 2018-04-13 | End: 2018-04-13

## 2018-04-13 RX ORDER — ONDANSETRON 8 MG/1
4 TABLET, FILM COATED ORAL EVERY 6 HOURS
Qty: 0 | Refills: 0 | Status: DISCONTINUED | OUTPATIENT
Start: 2018-04-13 | End: 2018-04-16

## 2018-04-13 RX ORDER — SACCHAROMYCES BOULARDII 250 MG
250 POWDER IN PACKET (EA) ORAL
Qty: 0 | Refills: 0 | Status: DISCONTINUED | OUTPATIENT
Start: 2018-04-13 | End: 2018-04-16

## 2018-04-13 RX ORDER — AMPICILLIN SODIUM AND SULBACTAM SODIUM 250; 125 MG/ML; MG/ML
3 INJECTION, POWDER, FOR SUSPENSION INTRAMUSCULAR; INTRAVENOUS ONCE
Qty: 0 | Refills: 0 | Status: COMPLETED | OUTPATIENT
Start: 2018-04-13 | End: 2018-04-13

## 2018-04-13 RX ORDER — AMPICILLIN SODIUM AND SULBACTAM SODIUM 250; 125 MG/ML; MG/ML
3 INJECTION, POWDER, FOR SUSPENSION INTRAMUSCULAR; INTRAVENOUS EVERY 6 HOURS
Qty: 0 | Refills: 0 | Status: DISCONTINUED | OUTPATIENT
Start: 2018-04-13 | End: 2018-04-16

## 2018-04-13 RX ORDER — ACETAMINOPHEN 500 MG
650 TABLET ORAL EVERY 6 HOURS
Qty: 0 | Refills: 0 | Status: DISCONTINUED | OUTPATIENT
Start: 2018-04-13 | End: 2018-04-16

## 2018-04-13 RX ORDER — SODIUM CHLORIDE 9 MG/ML
3 INJECTION INTRAMUSCULAR; INTRAVENOUS; SUBCUTANEOUS EVERY 8 HOURS
Qty: 0 | Refills: 0 | Status: DISCONTINUED | OUTPATIENT
Start: 2018-04-13 | End: 2018-04-16

## 2018-04-13 RX ORDER — KETOROLAC TROMETHAMINE 30 MG/ML
30 SYRINGE (ML) INJECTION EVERY 6 HOURS
Qty: 0 | Refills: 0 | Status: DISCONTINUED | OUTPATIENT
Start: 2018-04-13 | End: 2018-04-16

## 2018-04-13 RX ORDER — MORPHINE SULFATE 50 MG/1
5 CAPSULE, EXTENDED RELEASE ORAL ONCE
Qty: 0 | Refills: 0 | Status: DISCONTINUED | OUTPATIENT
Start: 2018-04-13 | End: 2018-04-13

## 2018-04-13 RX ORDER — IBUPROFEN 200 MG
400 TABLET ORAL EVERY 6 HOURS
Qty: 0 | Refills: 0 | Status: DISCONTINUED | OUTPATIENT
Start: 2018-04-13 | End: 2018-04-13

## 2018-04-13 RX ORDER — KETOROLAC TROMETHAMINE 30 MG/ML
15 SYRINGE (ML) INJECTION ONCE
Qty: 0 | Refills: 0 | Status: DISCONTINUED | OUTPATIENT
Start: 2018-04-13 | End: 2018-04-13

## 2018-04-13 RX ADMIN — Medication 15 MILLIGRAM(S): at 16:15

## 2018-04-13 RX ADMIN — AMPICILLIN SODIUM AND SULBACTAM SODIUM 200 GRAM(S): 250; 125 INJECTION, POWDER, FOR SUSPENSION INTRAMUSCULAR; INTRAVENOUS at 23:04

## 2018-04-13 RX ADMIN — AMPICILLIN SODIUM AND SULBACTAM SODIUM 200 GRAM(S): 250; 125 INJECTION, POWDER, FOR SUSPENSION INTRAMUSCULAR; INTRAVENOUS at 16:23

## 2018-04-13 RX ADMIN — MORPHINE SULFATE 5 MILLIGRAM(S): 50 CAPSULE, EXTENDED RELEASE ORAL at 21:10

## 2018-04-13 NOTE — ED PROVIDER NOTE - ATTENDING CONTRIBUTION TO CARE
swelling and abscess formation of the right arm progressing for the past 2 days.  feeling feverish and run down.  denies h/o dm. +smoker.  Taking left-over clinda for the past 2 days.  PE: nontoxic appearing, NAD, swelling of volar aspect of right arm (distal humerus, olecranon fossa, proximal forearm) with central, daron-sized pustule, tender axillary LN.  A/P  abscess:  IV abx abd surgical consult for I&D.

## 2018-04-13 NOTE — H&P ADULT - PROBLEM SELECTOR PLAN 2
SATP referral, patient agreeable and has a desire to stop. Clonidine for withdrawal symptoms, Methadone if clonidine fails to control. check HCV, recent HIV screen neg.

## 2018-04-13 NOTE — H&P ADULT - ASSESSMENT
28 y/o male with RUE cellulitis/Abscess s/p I&D, active IVDA, Heroine abuse, cocaine abuse, active smoker

## 2018-04-13 NOTE — CONSULT NOTE ADULT - SUBJECTIVE AND OBJECTIVE BOX
Pt Name: CARLYN NAGY    MRN: [id number]    Patient is a 27y Male presenting to the emergency department with a chief complaint of right forearm pain and swelling x 2 days after injecting drugs into right forearm  Patient states he injects heroin daily, has a history of arm infections due to drug use  Patient had left forearm I&D last year  Denies any fevers, chills, CP, SOB  No other complaints    REVIEW OF SYSTEMS    General: Alert, responsive, in NAD    Skin/Breast: No rashes, no pruritis   	  Ophthalmologic: No visual changes. No redness.   	  ENMT:	No discharge. No swelling.    Respiratory and Thorax: No difficulty breathing. No cough.  	   Cardiovascular:	No chest pain. No palpitations.    Gastrointestinal:	 No abdominal pain. No diarrhea.     Genitourinary: No dysuria. No bleeding.    Musculoskeletal: SEE HPI.    Neurological: No sensory or motor changes.     Psychiatric: No anxiety or depression.    Hematology/Lymphatics: No swelling.    Endocrine: No Hx of diabetes.    ROS is otherwise negative.    PAST MEDICAL & SURGICAL HISTORY:  PAST MEDICAL & SURGICAL HISTORY:  Abscess  Heroin abuse  Abscess of arm, right: s/p I&amp;D    Allergies: No Known Allergies    Medications: ampicillin/sulbactam  IVPB 3 Gram(s) IV Intermittent every 6 hours    FAMILY HISTORY:  No pertinent family history in first degree relatives  : non-contributory    Social History: IVDA    Ambulation: Walking independently                           14.5   13.5  )-----------( 242      ( 13 Apr 2018 16:11 )             41.2       04-13    137  |  97<L>  |  13.0  ----------------------------<  103  4.7   |  26.0  |  0.94    Ca    9.6      13 Apr 2018 16:11    TPro  8.2  /  Alb  4.1  /  TBili  0.6  /  DBili  x   /  AST  14  /  ALT  13  /  AlkPhos  54  04-13      Vital Signs Last 24 Hrs  T(C): 37.1 (13 Apr 2018 13:40), Max: 37.1 (13 Apr 2018 13:40)  T(F): 98.7 (13 Apr 2018 13:40), Max: 98.7 (13 Apr 2018 13:40)  HR: 109 (13 Apr 2018 13:40) (109 - 109)  BP: 154/92 (13 Apr 2018 13:40) (154/92 - 154/92)  BP(mean): --  RR: 20 (13 Apr 2018 13:40) (20 - 20)  SpO2: 98% (13 Apr 2018 13:40) (98% - 98%)    Daily Height in cm: 193.04 (13 Apr 2018 13:36)    Daily     PHYSICAL EXAM:    Appearance: Alert, responsive, in no acute distress.    Neurological: Sensation is grossly intact to light touch. 5/5 motor function distally of all extremities. No focal deficits or weaknesses found.    Vascular: 2+ distal pulses. Cap refill < 2 sec. No signs of venous insufficiency or stasis. No extremity ulcerations. No cyanosis.    Musculoskeletal:         Left Upper Extremity: + NROM without pain, surgical incision well healed, no bony tenderness or crepitus       Right Upper Extremity: skin intact, + erythema/ induration to volar aspect of proximal forearm, distal motor function/ sensation intact without deficit, 2+ radial pulse, cap refill <2 seconds       Left Lower Extremity: + NROM without pain, no bony tenderness or crepitus       Right Lower Extremity: + NROM without pain, no bony tenderness or crepitus    Imaging Studies:    EXAM:  FOREARM-ULNA & RADIUS-RIGHT                          PROCEDURE DATE:  04/13/2018      INTERPRETATION:    Right forearm AP and lateral 2 views:    Clinical History:    Abscess. .    Findings:    Bones and joints appear normal. No evidence of acute fracture or   dislocation. No focal osteolytic or sclerotic lesions. Soft tissues   appear normal. No radiopaque foreign body. No radiological evidence of   osteomyelitis.    Impression:    No acute fracture or dislocation.    A/P:  Pt is a 27y Male with found to have right forearm abscess    PLAN:   discussed with Dr. Ball  * IV Abx  * F/U US RUE  * Elevation encouraged  * pain control  * final recs pending after review of imaging Pt Name: CARLYN NAGY    MRN: [id number]    Patient is a 27y Male presenting to the emergency department with a chief complaint of right forearm pain and swelling x 2 days after injecting drugs into right forearm  Patient states he injects heroin daily, has a history of arm infections due to drug use  Patient had left forearm I&D last year  Denies any fevers, chills, CP, SOB  No other complaints    REVIEW OF SYSTEMS    General: Alert, responsive, in NAD    Skin/Breast: No rashes, no pruritis   	  Ophthalmologic: No visual changes. No redness.   	  ENMT:	No discharge. No swelling.    Respiratory and Thorax: No difficulty breathing. No cough.  	   Cardiovascular:	No chest pain. No palpitations.    Gastrointestinal:	 No abdominal pain. No diarrhea.     Genitourinary: No dysuria. No bleeding.    Musculoskeletal: SEE HPI.    Neurological: No sensory or motor changes.     Psychiatric: No anxiety or depression.    Hematology/Lymphatics: No swelling.    Endocrine: No Hx of diabetes.    ROS is otherwise negative.    PAST MEDICAL & SURGICAL HISTORY:  PAST MEDICAL & SURGICAL HISTORY:  Abscess  Heroin abuse  Abscess of arm, right: s/p I&amp;D    Allergies: No Known Allergies    Medications: ampicillin/sulbactam  IVPB 3 Gram(s) IV Intermittent every 6 hours    FAMILY HISTORY:  No pertinent family history in first degree relatives  : non-contributory    Social History: IVDA    Ambulation: Walking independently                           14.5   13.5  )-----------( 242      ( 13 Apr 2018 16:11 )             41.2       04-13    137  |  97<L>  |  13.0  ----------------------------<  103  4.7   |  26.0  |  0.94    Ca    9.6      13 Apr 2018 16:11    TPro  8.2  /  Alb  4.1  /  TBili  0.6  /  DBili  x   /  AST  14  /  ALT  13  /  AlkPhos  54  04-13      Vital Signs Last 24 Hrs  T(C): 37.1 (13 Apr 2018 13:40), Max: 37.1 (13 Apr 2018 13:40)  T(F): 98.7 (13 Apr 2018 13:40), Max: 98.7 (13 Apr 2018 13:40)  HR: 109 (13 Apr 2018 13:40) (109 - 109)  BP: 154/92 (13 Apr 2018 13:40) (154/92 - 154/92)  BP(mean): --  RR: 20 (13 Apr 2018 13:40) (20 - 20)  SpO2: 98% (13 Apr 2018 13:40) (98% - 98%)    Daily Height in cm: 193.04 (13 Apr 2018 13:36)    Daily     PHYSICAL EXAM:    Appearance: Alert, responsive, in no acute distress.    Neurological: Sensation is grossly intact to light touch. 5/5 motor function distally of all extremities. No focal deficits or weaknesses found.    Vascular: 2+ distal pulses. Cap refill < 2 sec. No signs of venous insufficiency or stasis. No extremity ulcerations. No cyanosis.    Musculoskeletal:         Left Upper Extremity: + NROM without pain, surgical incision well healed, no bony tenderness or crepitus       Right Upper Extremity: skin intact, + erythema/ induration to volar aspect of proximal forearm, distal motor function/ sensation intact without deficit, 2+ radial pulse, cap refill <2 seconds       Left Lower Extremity: + NROM without pain, no bony tenderness or crepitus       Right Lower Extremity: + NROM without pain, no bony tenderness or crepitus    Imaging Studies:    EXAM:  FOREARM-ULNA & RADIUS-RIGHT                          PROCEDURE DATE:  04/13/2018      INTERPRETATION:    Right forearm AP and lateral 2 views:    Clinical History:    Abscess. .    Findings:    Bones and joints appear normal. No evidence of acute fracture or   dislocation. No focal osteolytic or sclerotic lesions. Soft tissues   appear normal. No radiopaque foreign body. No radiological evidence of   osteomyelitis.    Impression:    No acute fracture or dislocation.    < from: US Joint Nonvasc Extremity Limited, Right (04.13.18 @ 19:34) >   EXAM:  US JOINT NONVASC EXT LTD RT                          PROCEDURE DATE:  04/13/2018          INTERPRETATION:  HISTORY: IVDA, right forearm swelling for 2 days.   Redness. Pain. Assess for abscess.    TECHNIQUE: Grayscale and color-flow Dopplerimaging.    FINDINGS:      There is a right mid forearm subcutaneous complex of fluid   collection measuring 6.1 x 2.5 x 3.9 cm which is avascular.  IMPRESSION: Subcutaneous a heterogeneous avascular soft tissue mass as   described.                  PRECIOUS BURKETT M.D., ATTENDING RADIOLOGIST  This document has been electronically signed. Apr 13 2018  7:52PM        < end of copied text >    Procedure: ABSCESS I&D     Performed by: Simeon Dias PA-C    Indication: Right forearm abscess confirmed on ultrasound    Consent: The risks and benefits of the procedure including nerve damage and bleeding were explained and the patient verbalized their understanding and wished to proceed with the procedure. Verbal consent was obtained following the discussion.    Procedure: Neurovascular exam intact prior to abscess I&D. Anesthesia/pain control, using aseptic technique, was administered via subcutaneous lidocaine/epinepherine. 1.5cm incision made over most fluctuant portion of abscess. Copious amount of purulent drainage expressed. 250cc normal saline used to flush abscess and packing was placed. Distally, the extremity was neurovascular intact following the procedure.  The patient tolerated the procedure well.    Complications: None      A/P:  Pt is a 27y Male with found to have right forearm abscess    PLAN:   discussed with Dr. Ball  * IV Abx  * Elevation encouraged  * pain control as clinically indicated  * Remove packing in the AM  * Peroxide cleanse tid

## 2018-04-13 NOTE — H&P ADULT - PROBLEM SELECTOR PLAN 1
F/U cultures. Cont. Unasyn based on multiple previous cultures. Hold Vanco for now with all previous cx being neg for MRSA. No hx of bacteremia and no murmur on exam. Check Blood cx if has fever. Cont. Probiotics, dressing care per ortho. SW consult for SATP referral. NSAIDs for pain control. VC boots, ambulation, regular diet

## 2018-04-13 NOTE — H&P ADULT - HISTORY OF PRESENT ILLNESS
26 y/o male with history of IVDA with heroine using 3-4 bags a day, last use today. He has been admitted multiple times in past to Medicine and Surgery with UE abscess requiring I&D and also for FB removal. He denies history of HCV/HIV. He states he uses fresh needles. Also admits to snorting cocaine on occasion. Came to ED today after his right forearm was red, hard and painful. He was taking old abx from prior DC which did not help. Denies fever, chills, N/V, SOB, CP. In ED patient with large area of induration/fluctuance and imaging confirmed an abscess. He was seen by ortho and had I&D with large amount of purulent fluid that was send for culture. Will be admitted to f/u cx, and cont. IV abx. No hx of MRSA in previous culture review.

## 2018-04-13 NOTE — ED ADULT NURSE NOTE - OBJECTIVE STATEMENT
Patient A&OX3, c/o pain to his R arm. Pt states that he used heroin and now has swelling and redness to his right.

## 2018-04-13 NOTE — ED PROVIDER NOTE - PROGRESS NOTE DETAILS
Orthopedic PA called US results back, Ortho I/D in Super track, plans for admission for IV antibiotics

## 2018-04-13 NOTE — SBIRT NOTE. - NSSBIRTSERVICES_GEN_A_ED
Full Screen ONLY/Smoking Cessation Information Provided/Brief Intervention Performed/Referral to Treatment Provided

## 2018-04-13 NOTE — ED PROVIDER NOTE - OBJECTIVE STATEMENT
Patient is 27 year old male who presents c/o right forearm swelling and pain. patient is IVDA, last used today.  States on Wed started developing pain and swelling to forearm.  patient using antibiotics that he had left over, no relief.  no fever, no chills

## 2018-04-13 NOTE — SBIRT NOTE. - NSSBIRTSERVICES_GEN_A_ED_FT
Naloxone Rescue Kit dispensed: Pt was educated about Naloxone and trained on how to assemble and utilize the kit. [place] [id number]  Provided SBIRT services: Full screen positive. Referral to Treatment attempted. Screening results were reviewed with the patient and patient was provided information about healthy guidelines and potential negative consequences associated with level of risk. Motivation and readiness to reduce or stop use was discussed and goals and activities to make changes were suggested/offered.  Options discussed for further evaluation and treatment, but referral to treatment was not completed because  Patient requires medical care - Pt stated that he is interested in discontinuing use   Audit Score: 3  DAST Score: 8  Duration = 25 Minutes

## 2018-04-14 LAB
BASOPHILS # BLD AUTO: 0 K/UL — SIGNIFICANT CHANGE UP (ref 0–0.2)
BASOPHILS NFR BLD AUTO: 0.3 % — SIGNIFICANT CHANGE UP (ref 0–2)
EOSINOPHIL # BLD AUTO: 0.2 K/UL — SIGNIFICANT CHANGE UP (ref 0–0.5)
EOSINOPHIL NFR BLD AUTO: 1.3 % — SIGNIFICANT CHANGE UP (ref 0–5)
GRAM STN FLD: SIGNIFICANT CHANGE UP
GRAM STN FLD: SIGNIFICANT CHANGE UP
HAV IGM SER-ACNC: SIGNIFICANT CHANGE UP
HBV CORE IGM SER-ACNC: SIGNIFICANT CHANGE UP
HBV SURFACE AG SER-ACNC: SIGNIFICANT CHANGE UP
HCT VFR BLD CALC: 36 % — LOW (ref 42–52)
HCV AB S/CO SERPL IA: 0.19 S/CO — SIGNIFICANT CHANGE UP
HCV AB SERPL-IMP: SIGNIFICANT CHANGE UP
HGB BLD-MCNC: 12.3 G/DL — LOW (ref 14–18)
LYMPHOCYTES # BLD AUTO: 19.1 % — LOW (ref 20–55)
LYMPHOCYTES # BLD AUTO: 2.4 K/UL — SIGNIFICANT CHANGE UP (ref 1–4.8)
MCHC RBC-ENTMCNC: 28.9 PG — SIGNIFICANT CHANGE UP (ref 27–31)
MCHC RBC-ENTMCNC: 34.2 G/DL — SIGNIFICANT CHANGE UP (ref 32–36)
MCV RBC AUTO: 84.5 FL — SIGNIFICANT CHANGE UP (ref 80–94)
MONOCYTES # BLD AUTO: 1.1 K/UL — HIGH (ref 0–0.8)
MONOCYTES NFR BLD AUTO: 8.6 % — SIGNIFICANT CHANGE UP (ref 3–10)
NEUTROPHILS # BLD AUTO: 8.9 K/UL — HIGH (ref 1.8–8)
NEUTROPHILS NFR BLD AUTO: 70.5 % — SIGNIFICANT CHANGE UP (ref 37–73)
PLATELET # BLD AUTO: 240 K/UL — SIGNIFICANT CHANGE UP (ref 150–400)
RBC # BLD: 4.26 M/UL — LOW (ref 4.6–6.2)
RBC # FLD: 12.9 % — SIGNIFICANT CHANGE UP (ref 11–15.6)
SPECIMEN SOURCE: SIGNIFICANT CHANGE UP
SPECIMEN SOURCE: SIGNIFICANT CHANGE UP
WBC # BLD: 12.6 K/UL — HIGH (ref 4.8–10.8)
WBC # FLD AUTO: 12.6 K/UL — HIGH (ref 4.8–10.8)

## 2018-04-14 PROCEDURE — 99233 SBSQ HOSP IP/OBS HIGH 50: CPT

## 2018-04-14 RX ORDER — OXYCODONE HYDROCHLORIDE 5 MG/1
10 TABLET ORAL EVERY 12 HOURS
Qty: 0 | Refills: 0 | Status: DISCONTINUED | OUTPATIENT
Start: 2018-04-14 | End: 2018-04-16

## 2018-04-14 RX ADMIN — Medication 250 MILLIGRAM(S): at 17:54

## 2018-04-14 RX ADMIN — SODIUM CHLORIDE 3 MILLILITER(S): 9 INJECTION INTRAMUSCULAR; INTRAVENOUS; SUBCUTANEOUS at 21:30

## 2018-04-14 RX ADMIN — SODIUM CHLORIDE 3 MILLILITER(S): 9 INJECTION INTRAMUSCULAR; INTRAVENOUS; SUBCUTANEOUS at 05:08

## 2018-04-14 RX ADMIN — OXYCODONE HYDROCHLORIDE 10 MILLIGRAM(S): 5 TABLET ORAL at 10:07

## 2018-04-14 RX ADMIN — Medication 30 MILLIGRAM(S): at 17:54

## 2018-04-14 RX ADMIN — Medication 30 MILLIGRAM(S): at 12:59

## 2018-04-14 RX ADMIN — Medication 30 MILLIGRAM(S): at 13:30

## 2018-04-14 RX ADMIN — SODIUM CHLORIDE 3 MILLILITER(S): 9 INJECTION INTRAMUSCULAR; INTRAVENOUS; SUBCUTANEOUS at 14:29

## 2018-04-14 RX ADMIN — AMPICILLIN SODIUM AND SULBACTAM SODIUM 200 GRAM(S): 250; 125 INJECTION, POWDER, FOR SUSPENSION INTRAMUSCULAR; INTRAVENOUS at 18:15

## 2018-04-14 RX ADMIN — Medication 1 TABLET(S): at 12:10

## 2018-04-14 RX ADMIN — Medication 250 MILLIGRAM(S): at 05:09

## 2018-04-14 RX ADMIN — Medication 0.1 MILLIGRAM(S): at 17:53

## 2018-04-14 RX ADMIN — AMPICILLIN SODIUM AND SULBACTAM SODIUM 200 GRAM(S): 250; 125 INJECTION, POWDER, FOR SUSPENSION INTRAMUSCULAR; INTRAVENOUS at 05:09

## 2018-04-14 RX ADMIN — AMPICILLIN SODIUM AND SULBACTAM SODIUM 200 GRAM(S): 250; 125 INJECTION, POWDER, FOR SUSPENSION INTRAMUSCULAR; INTRAVENOUS at 12:10

## 2018-04-14 RX ADMIN — Medication 30 MILLIGRAM(S): at 01:14

## 2018-04-14 RX ADMIN — Medication 30 MILLIGRAM(S): at 18:56

## 2018-04-14 RX ADMIN — Medication 0.1 MILLIGRAM(S): at 10:07

## 2018-04-14 RX ADMIN — OXYCODONE HYDROCHLORIDE 10 MILLIGRAM(S): 5 TABLET ORAL at 11:18

## 2018-04-14 RX ADMIN — OXYCODONE HYDROCHLORIDE 10 MILLIGRAM(S): 5 TABLET ORAL at 21:37

## 2018-04-14 RX ADMIN — Medication 30 MILLIGRAM(S): at 00:04

## 2018-04-14 NOTE — PROGRESS NOTE ADULT - SUBJECTIVE AND OBJECTIVE BOX
Packing removed. Wound irrigated. New bulky dressing applied.  Site redness has improved as per patient.   Mild surrounding induration.    PLAN:  * Will continue non-operative management  * Will start saline soaks tomorrow - TWICE daily  * No further packing  * Continue IV antibiotics

## 2018-04-14 NOTE — PROGRESS NOTE ADULT - SUBJECTIVE AND OBJECTIVE BOX
Pt Name: CARLYN NAGY    MRN: [id number]      Patient is a being followed for right forearm incision and drainage performed bedside yesterday in ED. the patient reports of proximal forearm redness, swelling and firmness. No sensory or motor changes are reported distally. No ROM restrictions are reported.       PAST MEDICAL & SURGICAL HISTORY:  Abscess  Heroin abuse  Abscess of arm, right: s/p I&amp;D      Allergies: No Known Allergies      Medications: acetaminophen   Tablet 650 milliGRAM(s) Oral every 6 hours PRN  ampicillin/sulbactam  IVPB 3 Gram(s) IV Intermittent every 6 hours  cloNIDine 0.1 milliGRAM(s) Oral every 8 hours PRN  ketorolac   Injectable 30 milliGRAM(s) IV Push every 6 hours PRN  multivitamin 1 Tablet(s) Oral daily  ondansetron Injectable 4 milliGRAM(s) IV Push every 6 hours PRN  saccharomyces boulardii 250 milliGRAM(s) Oral two times a day  sodium chloride 0.9% lock flush 3 milliLiter(s) IV Push every 8 hours        Ambulation: Walking independently [x] With Cane [ ] With Walker [ ]  Bedbound [ ]                           14.5   13.5  )-----------( 242      ( 13 Apr 2018 16:11 )             41.2     04-13    137  |  97<L>  |  13.0  ----------------------------<  103  4.7   |  26.0  |  0.94    Ca    9.6      13 Apr 2018 16:11    TPro  8.2  /  Alb  4.1  /  TBili  0.6  /  DBili  x   /  AST  14  /  ALT  13  /  AlkPhos  54  04-13      PHYSICAL EXAM:    Vital Signs Last 24 Hrs  T(C): 36.8 (14 Apr 2018 07:33), Max: 37.2 (13 Apr 2018 22:21)  T(F): 98.2 (14 Apr 2018 07:33), Max: 98.9 (13 Apr 2018 22:21)  HR: 64 (14 Apr 2018 07:33) (60 - 109)  BP: 121/60 (14 Apr 2018 07:33) (104/58 - 154/92)  BP(mean): 94 (13 Apr 2018 23:00) (94 - 94)  RR: 18 (14 Apr 2018 07:33) (16 - 20)  SpO2: 99% (14 Apr 2018 06:11) (98% - 100%)  Daily Height in cm: 193.04 (13 Apr 2018 13:36)    Daily     Appearance: Alert, responsive, in no acute distress.    Neurological: Sensation is grossly intact to light touch. 5/5 motor function of all extremities. No focal deficits or weaknesses found.    Skin: Bulky dressing is clean and dry. No rash on visible skin. Proximal forearm had induration and firmness c/w cellulitis.     Vascular: 2+ distal pulses. Cap refill < 2 sec. No signs of venous   insufficiency   or stasis. No extremity ulcerations. No cyanosis.    Musculoskeletal:         Left Upper Extremity:       Right Upper Extremity: Normal ROM of the shoulder, elbow and wrist and fingers. No finger clawing noted.         A/P:  Pt is a  27y Male with right forearm abscess with bedside incision and drainage 4/13/2018    PLAN:   * Will perform packing change and soaks starting tomorrow  * Will continue IV antibiotics  * Will continue elevation

## 2018-04-14 NOTE — PROGRESS NOTE ADULT - SUBJECTIVE AND OBJECTIVE BOX
CARLYN NAGY  ----------------------------------------  The patient was seen and evaluated for upper extremity abscess.  The patient is in no acute distress.  Denied any chest pain, palpitations, dyspnea, or abdominal pain.  Some arm pain.    Vital Signs Last 24 Hrs  T(C): 36.8 (14 Apr 2018 07:33), Max: 37.2 (13 Apr 2018 22:21)  T(F): 98.2 (14 Apr 2018 07:33), Max: 98.9 (13 Apr 2018 22:21)  HR: 64 (14 Apr 2018 07:33) (60 - 109)  BP: 121/60 (14 Apr 2018 07:33) (104/58 - 154/92)  BP(mean): 94 (13 Apr 2018 23:00) (94 - 94)  RR: 18 (14 Apr 2018 07:33) (16 - 20)  SpO2: 99% (14 Apr 2018 06:11) (98% - 100%)    PHYSICAL EXAMINATION:  ----------------------------------------  General appearance: NAD, Awake, Alert  HEENT: NCAT, Conjunctiva clear, EOMI  Neck: Supple, No JVD, No tenderness  Lungs: Clear to auscultation, Breath sound equal bilaterally, No wheezes, No rales  Cardiovascular: S1S2, Regular rhythm  Abdomen: Soft, Nontender, Nondistended, No guarding/rebound, Positive bowel sounds  Extremities: No clubbing, No cyanosis, No edema, No calf tenderness, Right upper extremity dressing clean and intact  Neuro: Strength equal bilaterally, No tremors  Psychiatric: Appropriate mood, Normal affect    LABORATORY STUDIES:  ----------------------------------------             14.5   13.5  )-----------( 242      ( 13 Apr 2018 16:11 )             41.2     04-13    137  |  97<L>  |  13.0  ----------------------------<  103  4.7   |  26.0  |  0.94    Ca    9.6      13 Apr 2018 16:11    TPro  8.2  /  Alb  4.1  /  TBili  0.6  /  DBili  x   /  AST  14  /  ALT  13  /  AlkPhos  54  04-13    LIVER FUNCTIONS - ( 13 Apr 2018 16:11 )  Alb: 4.1 g/dL / Pro: 8.2 g/dL / ALK PHOS: 54 U/L / ALT: 13 U/L / AST: 14 U/L / GGT: x           PT/INR - ( 13 Apr 2018 16:11 )   PT: 14.1 sec;   INR: 1.28 ratio      MEDICATIONS  (STANDING):  ampicillin/sulbactam  IVPB 3 Gram(s) IV Intermittent every 6 hours  multivitamin 1 Tablet(s) Oral daily  saccharomyces boulardii 250 milliGRAM(s) Oral two times a day  sodium chloride 0.9% lock flush 3 milliLiter(s) IV Push every 8 hours    MEDICATIONS  (PRN):  acetaminophen   Tablet 650 milliGRAM(s) Oral every 6 hours PRN For Temp greater than 38.5 C (101.3 F)  cloNIDine 0.1 milliGRAM(s) Oral every 8 hours PRN withdrawal symptoms  ketorolac   Injectable 30 milliGRAM(s) IV Push every 6 hours PRN Moderate Pain (4 - 6)  ondansetron Injectable 4 milliGRAM(s) IV Push every 6 hours PRN Nausea      ASSESSMENT / PLAN:  ----------------------------------------  Upper extremity abscess - Status post incision and drainage by Orthopedic Surgery. Follow up noted. Continue with wound care and packing. On ampicillin/sulbactam. Awaiting culture results. Analgesic medications as needed.    Heroin abuse - Referred to substance abuse treatment program. Clonidine as needed.

## 2018-04-15 LAB
ANION GAP SERPL CALC-SCNC: 13 MMOL/L — SIGNIFICANT CHANGE UP (ref 5–17)
BUN SERPL-MCNC: 14 MG/DL — SIGNIFICANT CHANGE UP (ref 8–20)
CALCIUM SERPL-MCNC: 9.7 MG/DL — SIGNIFICANT CHANGE UP (ref 8.6–10.2)
CHLORIDE SERPL-SCNC: 102 MMOL/L — SIGNIFICANT CHANGE UP (ref 98–107)
CO2 SERPL-SCNC: 28 MMOL/L — SIGNIFICANT CHANGE UP (ref 22–29)
CREAT SERPL-MCNC: 0.72 MG/DL — SIGNIFICANT CHANGE UP (ref 0.5–1.3)
GLUCOSE SERPL-MCNC: 96 MG/DL — SIGNIFICANT CHANGE UP (ref 70–115)
HCT VFR BLD CALC: 41.9 % — LOW (ref 42–52)
HGB BLD-MCNC: 14.1 G/DL — SIGNIFICANT CHANGE UP (ref 14–18)
MCHC RBC-ENTMCNC: 28.5 PG — SIGNIFICANT CHANGE UP (ref 27–31)
MCHC RBC-ENTMCNC: 33.7 G/DL — SIGNIFICANT CHANGE UP (ref 32–36)
MCV RBC AUTO: 84.6 FL — SIGNIFICANT CHANGE UP (ref 80–94)
PLATELET # BLD AUTO: 261 K/UL — SIGNIFICANT CHANGE UP (ref 150–400)
POTASSIUM SERPL-MCNC: 4.4 MMOL/L — SIGNIFICANT CHANGE UP (ref 3.5–5.3)
POTASSIUM SERPL-SCNC: 4.4 MMOL/L — SIGNIFICANT CHANGE UP (ref 3.5–5.3)
RBC # BLD: 4.95 M/UL — SIGNIFICANT CHANGE UP (ref 4.6–6.2)
RBC # FLD: 12.6 % — SIGNIFICANT CHANGE UP (ref 11–15.6)
SODIUM SERPL-SCNC: 143 MMOL/L — SIGNIFICANT CHANGE UP (ref 135–145)
WBC # BLD: 6.4 K/UL — SIGNIFICANT CHANGE UP (ref 4.8–10.8)
WBC # FLD AUTO: 6.4 K/UL — SIGNIFICANT CHANGE UP (ref 4.8–10.8)

## 2018-04-15 PROCEDURE — 99233 SBSQ HOSP IP/OBS HIGH 50: CPT

## 2018-04-15 RX ADMIN — Medication 30 MILLIGRAM(S): at 08:15

## 2018-04-15 RX ADMIN — OXYCODONE HYDROCHLORIDE 10 MILLIGRAM(S): 5 TABLET ORAL at 10:37

## 2018-04-15 RX ADMIN — AMPICILLIN SODIUM AND SULBACTAM SODIUM 200 GRAM(S): 250; 125 INJECTION, POWDER, FOR SUSPENSION INTRAMUSCULAR; INTRAVENOUS at 05:49

## 2018-04-15 RX ADMIN — SODIUM CHLORIDE 3 MILLILITER(S): 9 INJECTION INTRAMUSCULAR; INTRAVENOUS; SUBCUTANEOUS at 21:40

## 2018-04-15 RX ADMIN — Medication 30 MILLIGRAM(S): at 14:08

## 2018-04-15 RX ADMIN — AMPICILLIN SODIUM AND SULBACTAM SODIUM 200 GRAM(S): 250; 125 INJECTION, POWDER, FOR SUSPENSION INTRAMUSCULAR; INTRAVENOUS at 00:50

## 2018-04-15 RX ADMIN — AMPICILLIN SODIUM AND SULBACTAM SODIUM 200 GRAM(S): 250; 125 INJECTION, POWDER, FOR SUSPENSION INTRAMUSCULAR; INTRAVENOUS at 23:53

## 2018-04-15 RX ADMIN — SODIUM CHLORIDE 3 MILLILITER(S): 9 INJECTION INTRAMUSCULAR; INTRAVENOUS; SUBCUTANEOUS at 13:39

## 2018-04-15 RX ADMIN — OXYCODONE HYDROCHLORIDE 10 MILLIGRAM(S): 5 TABLET ORAL at 21:40

## 2018-04-15 RX ADMIN — Medication 250 MILLIGRAM(S): at 05:49

## 2018-04-15 RX ADMIN — OXYCODONE HYDROCHLORIDE 10 MILLIGRAM(S): 5 TABLET ORAL at 22:30

## 2018-04-15 RX ADMIN — Medication 30 MILLIGRAM(S): at 13:15

## 2018-04-15 RX ADMIN — AMPICILLIN SODIUM AND SULBACTAM SODIUM 200 GRAM(S): 250; 125 INJECTION, POWDER, FOR SUSPENSION INTRAMUSCULAR; INTRAVENOUS at 12:34

## 2018-04-15 RX ADMIN — OXYCODONE HYDROCHLORIDE 10 MILLIGRAM(S): 5 TABLET ORAL at 11:02

## 2018-04-15 RX ADMIN — Medication 250 MILLIGRAM(S): at 17:35

## 2018-04-15 RX ADMIN — SODIUM CHLORIDE 3 MILLILITER(S): 9 INJECTION INTRAMUSCULAR; INTRAVENOUS; SUBCUTANEOUS at 05:51

## 2018-04-15 RX ADMIN — AMPICILLIN SODIUM AND SULBACTAM SODIUM 200 GRAM(S): 250; 125 INJECTION, POWDER, FOR SUSPENSION INTRAMUSCULAR; INTRAVENOUS at 17:53

## 2018-04-15 RX ADMIN — Medication 1 TABLET(S): at 12:34

## 2018-04-15 NOTE — PROGRESS NOTE ADULT - SUBJECTIVE AND OBJECTIVE BOX
CARLYN NAGY    [id number]    History:  The patient is status post bedside I&D right forearm abscess on 4/13. Patient is doing well. Pain and swelling has improved per pt. The patient's pain is controlled using the prescribed pain medications. No new complaints. No acute motor or sensory changes are reported.    Vital Signs Last 24 Hrs  T(C): 36.4 (15 Apr 2018 07:53), Max: 36.6 (14 Apr 2018 16:32)  T(F): 97.6 (15 Apr 2018 07:53), Max: 97.8 (14 Apr 2018 16:32)  HR: 52 (15 Apr 2018 07:53) (51 - 59)  BP: 91/55 (15 Apr 2018 07:53) (91/55 - 97/52)  BP(mean): --  RR: 18 (15 Apr 2018 07:53) (18 - 18)  SpO2: 99% (15 Apr 2018 07:53) (99% - 100%)  I&O's Summary    14 Apr 2018 07:01  -  15 Apr 2018 07:00  --------------------------------------------------------  IN: 440 mL / OUT: 0 mL / NET: 440 mL                              12.3   12.6  )-----------( 240      ( 14 Apr 2018 08:52 )             36.0     04-13    137  |  97<L>  |  13.0  ----------------------------<  103  4.7   |  26.0  |  0.94    Ca    9.6      13 Apr 2018 16:11    TPro  8.2  /  Alb  4.1  /  TBili  0.6  /  DBili  x   /  AST  14  /  ALT  13  /  AlkPhos  54  04-13    Abscess culture- No growth x 1 day        MEDICATIONS  (STANDING):  ampicillin/sulbactam  IVPB 3 Gram(s) IV Intermittent every 6 hours  multivitamin 1 Tablet(s) Oral daily  oxyCODONE  ER Tablet 10 milliGRAM(s) Oral every 12 hours  saccharomyces boulardii 250 milliGRAM(s) Oral two times a day  sodium chloride 0.9% lock flush 3 milliLiter(s) IV Push every 8 hours    MEDICATIONS  (PRN):  acetaminophen   Tablet 650 milliGRAM(s) Oral every 6 hours PRN For Temp greater than 38.5 C (101.3 F)  cloNIDine 0.1 milliGRAM(s) Oral every 8 hours PRN withdrawal symptoms  ketorolac   Injectable 30 milliGRAM(s) IV Push every 6 hours PRN Moderate Pain (4 - 6)  ondansetron Injectable 4 milliGRAM(s) IV Push every 6 hours PRN Nausea      Physical exam: Sitting in bed in NAD. Right upper extremity- The dressing is intact. Small amount of bloody dc on inside dsg. No active dc. No purulence. +erythema surrounding wound, +improvement. +area of induration around wound. Swelling decreased. Forearm soft, compartments soft, NT. +ROM elbow/wrist/fingers. Sensation intact. Radial pulse 2+. Brisk cap refill.     Primary Orthopedic Assessment:  • S/P I&D right forearm abscess    Plan:   • Wound irrigated with peroxide and normal saline  - Dry dsg applied  - Continue to elevate RUE, continue rom  - pain control  -IV abx  -F/U final cultures  - Continue BID soaks and dsg changes

## 2018-04-15 NOTE — CHART NOTE - NSCHARTNOTEFT_GEN_A_CORE
Hand Attending    Patient seen and examined. Doing better. No fevers. s/p I&D Rt forearm IVDA abscess    RUE - wound - clean, dry, mild amount of induration; no purulence expressible  erythema resolved; NV intact    Cultures- NGTD    A/P - s/p I&D Rt forearm abscess  - continue antibiotics per ID - OK from hand perspective to d/c on PO antibiotics  - warm compresses  - f/u in office in 5-7 days

## 2018-04-15 NOTE — PROGRESS NOTE ADULT - SUBJECTIVE AND OBJECTIVE BOX
CARLYN NAGY  ----------------------------------------  The patient was seen and evaluated for abscess.  The patient is in no acute distress.  Denied any chest pain, palpitations, dyspnea, or abdominal pain.  Offers no complaints.    Vital Signs Last 24 Hrs  T(C): 36.4 (15 Apr 2018 07:53), Max: 36.6 (14 Apr 2018 16:32)  T(F): 97.6 (15 Apr 2018 07:53), Max: 97.8 (14 Apr 2018 16:32)  HR: 52 (15 Apr 2018 07:53) (51 - 59)  BP: 91/55 (15 Apr 2018 07:53) (91/55 - 97/52)  BP(mean): --  RR: 18 (15 Apr 2018 07:53) (18 - 18)  SpO2: 99% (15 Apr 2018 07:53) (99% - 100%)    PHYSICAL EXAMINATION:  ----------------------------------------  General appearance: NAD, Awake, Alert  HEENT: NCAT, Conjunctiva clear, EOMI  Neck: Supple, No JVD, No tenderness  Lungs: Clear to auscultation, Breath sound equal bilaterally, No wheezes, No rales  Cardiovascular: S1S2, Regular rhythm  Abdomen: Soft, Nontender, Nondistended, No guarding/rebound, Positive bowel sounds  Extremities: No clubbing, No cyanosis, No edema, No calf tenderness, Right upper extremity dressing clean and intact  Neuro: Strength equal bilaterally, No tremors  Psychiatric: Appropriate mood, Normal affect    LABORATORY STUDIES:  ----------------------------------------             12.3   12.6  )-----------( 240      ( 14 Apr 2018 08:52 )             36.0     04-13    137  |  97<L>  |  13.0  ----------------------------<  103  4.7   |  26.0  |  0.94    Ca    9.6      13 Apr 2018 16:11    TPro  8.2  /  Alb  4.1  /  TBili  0.6  /  DBili  x   /  AST  14  /  ALT  13  /  AlkPhos  54  04-13    LIVER FUNCTIONS - ( 13 Apr 2018 16:11 )  Alb: 4.1 g/dL / Pro: 8.2 g/dL / ALK PHOS: 54 U/L / ALT: 13 U/L / AST: 14 U/L / GGT: x           PT/INR - ( 13 Apr 2018 16:11 )   PT: 14.1 sec;   INR: 1.28 ratio      Culture - Abscess with Gram Stain (collected 13 Apr 2018 22:42)  Source: .Abscess Arm - Right  Gram Stain (14 Apr 2018 11:57):    Gram stain  not performed.    Only one swab received.  Preliminary Report (15 Apr 2018 09:15):    No growth at 1 day.    Culture in progress    Culture - Abscess with Gram Stain (collected 13 Apr 2018 22:42)  Source: .Abscess Arm - Right  Gram Stain (14 Apr 2018 11:56):    Gram stain not performed.    Only one swab received.  Preliminary Report (15 Apr 2018 09:18):    No growth at 1 day.    Culture in progress    MEDICATIONS  (STANDING):  ampicillin/sulbactam  IVPB 3 Gram(s) IV Intermittent every 6 hours  multivitamin 1 Tablet(s) Oral daily  oxyCODONE  ER Tablet 10 milliGRAM(s) Oral every 12 hours  saccharomyces boulardii 250 milliGRAM(s) Oral two times a day  sodium chloride 0.9% lock flush 3 milliLiter(s) IV Push every 8 hours    MEDICATIONS  (PRN):  acetaminophen   Tablet 650 milliGRAM(s) Oral every 6 hours PRN For Temp greater than 38.5 C (101.3 F)  cloNIDine 0.1 milliGRAM(s) Oral every 8 hours PRN withdrawal symptoms  ketorolac   Injectable 30 milliGRAM(s) IV Push every 6 hours PRN Moderate Pain (4 - 6)  ondansetron Injectable 4 milliGRAM(s) IV Push every 6 hours PRN Nausea      ASSESSMENT / PLAN:  ----------------------------------------  Upper extremity abscess - Orthopedics follow up noted. Continue with wound care and elevation. Wound cultures were without growth so far. Afebril. On ampicillin/sulbactam.    Heroin abuse - Clonidine as needed. Recommended seeking substance abuse treatement program.

## 2018-04-16 ENCOUNTER — TRANSCRIPTION ENCOUNTER (OUTPATIENT)
Age: 28
End: 2018-04-16

## 2018-04-16 VITALS
HEART RATE: 64 BPM | TEMPERATURE: 98 F | DIASTOLIC BLOOD PRESSURE: 64 MMHG | RESPIRATION RATE: 18 BRPM | OXYGEN SATURATION: 97 % | SYSTOLIC BLOOD PRESSURE: 110 MMHG

## 2018-04-16 LAB
HCT VFR BLD CALC: 37 % — LOW (ref 42–52)
HGB BLD-MCNC: 12.6 G/DL — LOW (ref 14–18)
MCHC RBC-ENTMCNC: 28.8 PG — SIGNIFICANT CHANGE UP (ref 27–31)
MCHC RBC-ENTMCNC: 34.1 G/DL — SIGNIFICANT CHANGE UP (ref 32–36)
MCV RBC AUTO: 84.7 FL — SIGNIFICANT CHANGE UP (ref 80–94)
PLATELET # BLD AUTO: 286 K/UL — SIGNIFICANT CHANGE UP (ref 150–400)
RBC # BLD: 4.37 M/UL — LOW (ref 4.6–6.2)
RBC # FLD: 12.5 % — SIGNIFICANT CHANGE UP (ref 11–15.6)
WBC # BLD: 7.2 K/UL — SIGNIFICANT CHANGE UP (ref 4.8–10.8)
WBC # FLD AUTO: 7.2 K/UL — SIGNIFICANT CHANGE UP (ref 4.8–10.8)

## 2018-04-16 PROCEDURE — 99239 HOSP IP/OBS DSCHRG MGMT >30: CPT

## 2018-04-16 RX ORDER — OXYCODONE HYDROCHLORIDE 5 MG/1
1 TABLET ORAL
Qty: 12 | Refills: 0 | OUTPATIENT
Start: 2018-04-16 | End: 2018-04-18

## 2018-04-16 RX ORDER — CEPHALEXIN 500 MG
1 CAPSULE ORAL
Qty: 8 | Refills: 0 | OUTPATIENT
Start: 2018-04-16 | End: 2018-04-19

## 2018-04-16 RX ADMIN — Medication 30 MILLIGRAM(S): at 10:59

## 2018-04-16 RX ADMIN — OXYCODONE HYDROCHLORIDE 10 MILLIGRAM(S): 5 TABLET ORAL at 10:22

## 2018-04-16 RX ADMIN — SODIUM CHLORIDE 3 MILLILITER(S): 9 INJECTION INTRAMUSCULAR; INTRAVENOUS; SUBCUTANEOUS at 14:00

## 2018-04-16 RX ADMIN — SODIUM CHLORIDE 3 MILLILITER(S): 9 INJECTION INTRAMUSCULAR; INTRAVENOUS; SUBCUTANEOUS at 08:33

## 2018-04-16 RX ADMIN — Medication 250 MILLIGRAM(S): at 05:00

## 2018-04-16 RX ADMIN — Medication 30 MILLIGRAM(S): at 10:23

## 2018-04-16 RX ADMIN — AMPICILLIN SODIUM AND SULBACTAM SODIUM 200 GRAM(S): 250; 125 INJECTION, POWDER, FOR SUSPENSION INTRAMUSCULAR; INTRAVENOUS at 05:00

## 2018-04-16 RX ADMIN — OXYCODONE HYDROCHLORIDE 10 MILLIGRAM(S): 5 TABLET ORAL at 10:59

## 2018-04-16 RX ADMIN — AMPICILLIN SODIUM AND SULBACTAM SODIUM 200 GRAM(S): 250; 125 INJECTION, POWDER, FOR SUSPENSION INTRAMUSCULAR; INTRAVENOUS at 12:38

## 2018-04-16 RX ADMIN — Medication 1 TABLET(S): at 12:38

## 2018-04-16 NOTE — PROGRESS NOTE ADULT - SUBJECTIVE AND OBJECTIVE BOX
CARLYN NAGY    [id number]    History: 27y Male is status post R forearm abscess bedside I&D x 3 days. Patient is doing well and is comfortable. The patient's pain is controlled using the prescribed pain medications. Denies nausea, vomiting, chest pain, shortness of breath, abdominal pain or fever. No new complaints.                            14.1   6.4   )-----------( 261      ( 15 Apr 2018 08:23 )             41.9     04-15    143  |  102  |  14.0  ----------------------------<  96  4.4   |  28.0  |  0.72    Ca    9.7      15 Apr 2018 08:23        MEDICATIONS  (STANDING):  ampicillin/sulbactam  IVPB 3 Gram(s) IV Intermittent every 6 hours  multivitamin 1 Tablet(s) Oral daily  oxyCODONE  ER Tablet 10 milliGRAM(s) Oral every 12 hours  saccharomyces boulardii 250 milliGRAM(s) Oral two times a day  sodium chloride 0.9% lock flush 3 milliLiter(s) IV Push every 8 hours    MEDICATIONS  (PRN):  acetaminophen   Tablet 650 milliGRAM(s) Oral every 6 hours PRN For Temp greater than 38.5 C (101.3 F)  cloNIDine 0.1 milliGRAM(s) Oral every 8 hours PRN withdrawal symptoms  ketorolac   Injectable 30 milliGRAM(s) IV Push every 6 hours PRN Moderate Pain (4 - 6)  ondansetron Injectable 4 milliGRAM(s) IV Push every 6 hours PRN Nausea      Physical exam: Saline/Hydrogen peroxide soak performed by nursing this morning. The dressing is clean, dry and intact. No drainage or discharge. No erythema is noted. No blistering. No ecchymosis. Forearm wound appears to be closed at this time. Sensation to light touch is grossly intact distally. Motor function distally is 5/5. 2+ radial pulse. Capillary refill is less than 2 seconds. No cyanosis.    Primary Orthopedic Assessment:  • 27y Male is status post R forearm abscess bedside I&D x 3 days      Plan:   • Pain control as clinically indicated  • Weightbearing as tolerated of the right upper extremity  • discharge with oral abx as per ID  • Discharge planning – anticipated discharge is Home. F/u with Dr. Ball Wednesday outpatient.

## 2018-04-16 NOTE — DISCHARGE NOTE ADULT - MEDICATION SUMMARY - MEDICATIONS TO TAKE
I will START or STAY ON the medications listed below when I get home from the hospital:    oxyCODONE 5 mg oral tablet  -- 1 tab(s) by mouth every 6 hours, As Needed MDD:4 tabs  -- Caution federal law prohibits the transfer of this drug to any person other  than the person for whom it was prescribed.  It is very important that you take or use this exactly as directed.  Do not skip doses or discontinue unless directed by your doctor.  May cause drowsiness.  Alcohol may intensify this effect.  Use care when operating dangerous machinery.  This prescription cannot be refilled.  Using more of this medication than prescribed may cause serious breathing problems.    -- Indication: For Pain    Keflex 500 mg oral capsule  -- 1 cap(s) by mouth every 12 hours   -- Finish all this medication unless otherwise directed by prescriber.    -- Indication: For Abscess

## 2018-04-16 NOTE — PROGRESS NOTE ADULT - SUBJECTIVE AND OBJECTIVE BOX
CARLYN NAGY  ----------------------------------------  The patient was seen and evaluated for abscess.  The patient is in no acute distress.  Denied any chest pain, palpitations, dyspnea, or abdominal pain.  Offers no complaints.    Vital Signs Last 24 Hrs  T(C): 36.1 (16 Apr 2018 08:31), Max: 36.7 (15 Apr 2018 23:02)  T(F): 97 (16 Apr 2018 08:31), Max: 98.1 (15 Apr 2018 23:02)  HR: 59 (16 Apr 2018 08:31) (53 - 70)  BP: 111/63 (16 Apr 2018 08:31) (101/60 - 111/72)  BP(mean): --  RR: 18 (16 Apr 2018 08:31) (18 - 18)  SpO2: 95% (16 Apr 2018 08:31) (95% - 99%)    PHYSICAL EXAMINATION:  ----------------------------------------  General appearance: NAD, Awake, Alert  HEENT: NCAT, Conjunctiva clear, EOMI  Neck: Supple, No JVD, No tenderness  Lungs: Clear to auscultation, Breath sound equal bilaterally, No wheezes, No rales  Cardiovascular: S1S2, Regular rhythm  Abdomen: Soft, Nontender, Nondistended, No guarding/rebound, Positive bowel sounds  Extremities: No clubbing, No cyanosis, No edema, No calf tenderness, Right upper extremity dressing clean and intact  Neuro: Strength equal bilaterally, No tremors  Psychiatric: Appropriate mood, Normal affect    LABORATORY STUDIES:  ----------------------------------------             12.6   7.2   )-----------( 286      ( 16 Apr 2018 08:19 )             37.0     04-15    143  |  102  |  14.0  ----------------------------<  96  4.4   |  28.0  |  0.72    Ca    9.7      15 Apr 2018 08:23    Culture - Abscess with Gram Stain (collected 13 Apr 2018 22:42)  Source: .Abscess Arm - Right  Gram Stain (14 Apr 2018 11:57):    Gram stain  not performed.    Only one swab received.  Preliminary Report (15 Apr 2018 09:15):    No growth at 1 day.    Culture in progress    Culture - Abscess with Gram Stain (collected 13 Apr 2018 22:42)  Source: .Abscess Arm - Right  Gram Stain (14 Apr 2018 11:56):    Gram stain not performed.    Only one swab received.  Preliminary Report (15 Apr 2018 09:18):    No growth at 1 day.    Culture in progress    MEDICATIONS  (STANDING):  ampicillin/sulbactam  IVPB 3 Gram(s) IV Intermittent every 6 hours  multivitamin 1 Tablet(s) Oral daily  oxyCODONE  ER Tablet 10 milliGRAM(s) Oral every 12 hours  saccharomyces boulardii 250 milliGRAM(s) Oral two times a day  sodium chloride 0.9% lock flush 3 milliLiter(s) IV Push every 8 hours    MEDICATIONS  (PRN):  acetaminophen   Tablet 650 milliGRAM(s) Oral every 6 hours PRN For Temp greater than 38.5 C (101.3 F)  cloNIDine 0.1 milliGRAM(s) Oral every 8 hours PRN withdrawal symptoms  ketorolac   Injectable 30 milliGRAM(s) IV Push every 6 hours PRN Moderate Pain (4 - 6)  ondansetron Injectable 4 milliGRAM(s) IV Push every 6 hours PRN Nausea      ASSESSMENT / PLAN:  ----------------------------------------  Upper extremity abscess - Orthopedics follow up noted. Continue with wound care and elevation. Wound cultures were without growth. Afebrile and without leukocytosis. On ampicillin/sulbactam.    Heroin abuse - Recommended seeking substance abuse treatement program.

## 2018-04-16 NOTE — DISCHARGE NOTE ADULT - HOSPITAL COURSE
27M with a prior admission for abscess of the arm requiring incision and drainage presented with a two day history of right forearm swelling and pain after injecting drugs. On presentation, WBC(13.5). Xray of the forearm was without fracture or dislocation. Right upper extremity ultrasound noted a right mid forearm subcutaneous nonvascular complex of fluid collection measuring 6.1 x 2.5 x 3.9 cm. Empiric antibiotics were initiated for abscess. The patient was seen by Orthopedic in consultation and underwent incision and drainage of the forearm. The analgesic medications were adjusted for pain control. The patient remained afebrile and had resolution of the leukocytosis with further antibiotic therapy and wound care. Wound culture was without growth. The patient was seen by Orthopedics and thought to be stable for further management on an outpatient basis.    35 minutes total time

## 2018-04-16 NOTE — DISCHARGE NOTE ADULT - PATIENT PORTAL LINK FT
You can access the [facility] Patient Portal, offered by [facility], by registering with the following website: [email or web address]

## 2018-04-16 NOTE — DISCHARGE NOTE ADULT - CARE PROVIDER_API CALL
Iona Ball), Orthopaedic Surgery; Surgery of the Hand  [address] [place] [address]  Phone: [phone]  Fax: [phone] Iona Ball), Orthopaedic Surgery; Surgery of the Hand  [address] [place] [address]  Phone: [phone]  Fax: [phone]    Michael Mcclure), Internal Medicine  [address] [place] [address]  Phone: [phone]  Fax: [phone]

## 2018-04-16 NOTE — DISCHARGE NOTE ADULT - ADDITIONAL INSTRUCTIONS
Orthopedic Discharge Instructions   Continue dressing changes daily   Keep wound clean and dry   Follow up with Dr. Ball upon discharge from hospital   Return to ER if any increase in symptoms

## 2018-04-16 NOTE — DISCHARGE NOTE ADULT - CARE PLAN
Principal Discharge DX:	Abscess  Goal:	.  Assessment and plan of treatment:	Complete the antibiotics as prescribed. Seek assistance with substance abuse. Follow up with your primary care physician for further management.

## 2018-04-16 NOTE — DISCHARGE NOTE ADULT - PLAN OF CARE
. Complete the antibiotics as prescribed. Seek assistance with substance abuse. Follow up with your primary care physician for further management.

## 2018-04-18 LAB
-  CEFTRIAXONE: SIGNIFICANT CHANGE UP
-  CEFTRIAXONE: SIGNIFICANT CHANGE UP
-  CLINDAMYCIN: SIGNIFICANT CHANGE UP
-  CLINDAMYCIN: SIGNIFICANT CHANGE UP
-  ERYTHROMYCIN: SIGNIFICANT CHANGE UP
-  ERYTHROMYCIN: SIGNIFICANT CHANGE UP
-  PENICILLIN: SIGNIFICANT CHANGE UP
-  PENICILLIN: SIGNIFICANT CHANGE UP
-  VANCOMYCIN: SIGNIFICANT CHANGE UP
-  VANCOMYCIN: SIGNIFICANT CHANGE UP
CULTURE RESULTS: SIGNIFICANT CHANGE UP
METHOD TYPE: SIGNIFICANT CHANGE UP
METHOD TYPE: SIGNIFICANT CHANGE UP
ORGANISM # SPEC MICROSCOPIC CNT: SIGNIFICANT CHANGE UP
ORGANISM # SPEC MICROSCOPIC CNT: SIGNIFICANT CHANGE UP
SPECIMEN SOURCE: SIGNIFICANT CHANGE UP

## 2018-04-19 LAB
CULTURE RESULTS: SIGNIFICANT CHANGE UP
ORGANISM # SPEC MICROSCOPIC CNT: SIGNIFICANT CHANGE UP
ORGANISM # SPEC MICROSCOPIC CNT: SIGNIFICANT CHANGE UP
SPECIMEN SOURCE: SIGNIFICANT CHANGE UP

## 2018-04-24 PROCEDURE — 87186 SC STD MICRODIL/AGAR DIL: CPT

## 2018-04-24 PROCEDURE — 86900 BLOOD TYPING SEROLOGIC ABO: CPT

## 2018-04-24 PROCEDURE — 87205 SMEAR GRAM STAIN: CPT

## 2018-04-24 PROCEDURE — 73090 X-RAY EXAM OF FOREARM: CPT

## 2018-04-24 PROCEDURE — 86901 BLOOD TYPING SEROLOGIC RH(D): CPT

## 2018-04-24 PROCEDURE — 80053 COMPREHEN METABOLIC PANEL: CPT

## 2018-04-24 PROCEDURE — 96375 TX/PRO/DX INJ NEW DRUG ADDON: CPT

## 2018-04-24 PROCEDURE — 85027 COMPLETE CBC AUTOMATED: CPT

## 2018-04-24 PROCEDURE — 80048 BASIC METABOLIC PNL TOTAL CA: CPT

## 2018-04-24 PROCEDURE — 96374 THER/PROPH/DIAG INJ IV PUSH: CPT

## 2018-04-24 PROCEDURE — 86703 HIV-1/HIV-2 1 RESULT ANTBDY: CPT

## 2018-04-24 PROCEDURE — 71045 X-RAY EXAM CHEST 1 VIEW: CPT

## 2018-04-24 PROCEDURE — 36415 COLL VENOUS BLD VENIPUNCTURE: CPT

## 2018-04-24 PROCEDURE — 96372 THER/PROPH/DIAG INJ SC/IM: CPT | Mod: XU

## 2018-04-24 PROCEDURE — 80074 ACUTE HEPATITIS PANEL: CPT

## 2018-04-24 PROCEDURE — 86850 RBC ANTIBODY SCREEN: CPT

## 2018-04-24 PROCEDURE — 85610 PROTHROMBIN TIME: CPT

## 2018-04-24 PROCEDURE — 76882 US LMTD JT/FCL EVL NVASC XTR: CPT

## 2018-04-24 PROCEDURE — 87070 CULTURE OTHR SPECIMN AEROBIC: CPT

## 2018-04-24 PROCEDURE — 83605 ASSAY OF LACTIC ACID: CPT

## 2018-04-24 PROCEDURE — 99285 EMERGENCY DEPT VISIT HI MDM: CPT | Mod: 25

## 2018-05-01 ENCOUNTER — OUTPATIENT (OUTPATIENT)
Dept: OUTPATIENT SERVICES | Facility: HOSPITAL | Age: 28
LOS: 1 days | End: 2018-05-01
Payer: MEDICAID

## 2018-05-01 DIAGNOSIS — L02.413 CUTANEOUS ABSCESS OF RIGHT UPPER LIMB: Chronic | ICD-10-CM

## 2018-05-01 PROCEDURE — G9001: CPT

## 2018-05-15 DIAGNOSIS — R69 ILLNESS, UNSPECIFIED: ICD-10-CM

## 2018-08-21 NOTE — PATIENT PROFILE ADULT. - ANESTHESIA, PREVIOUS REACTION, PROFILE
"Requested Prescriptions   Pending Prescriptions Disp Refills     BASAGLAR 100 UNIT/ML injection [Pharmacy Med Name: BASAGLAR 100UNIT    INJ]  Last Written Prescription Date:  8/4/17  Last Fill Quantity: 9mL,  # refills: 5   Last office visit: No previous visit found with prescribing provider:  Joan   Future Office Visit:      5     Sig: INJECT 19  SUBCUTANEOUSLY AT BEDTIME    Long Acting Insulin Protocol Passed    8/21/2018  5:26 PM       Passed - Blood pressure less than 140/90 in past 6 months    BP Readings from Last 3 Encounters:   05/22/18 122/77   05/16/18 (!) 169/91   11/28/17 158/90                Passed - LDL on file in past 12 months    Recent Labs   Lab Test  05/16/18   1234   LDL  162*            Passed - Microalbumin on file in past 12 months    Recent Labs   Lab Test  05/16/18   1252   MICROL  530   UMALCR  194.14*            Passed - Serum creatinine on file in past 12 months    Recent Labs   Lab Test  05/16/18   1234   CR  0.73            Passed - HgbA1C in past 3 or 6 months    If HgbA1C is 8 or greater, it needs to be on file within the past 3 months.  If less than 8, must be on file within the past 6 months.     Recent Labs   Lab Test  05/16/18   1234   A1C  7.3*            Passed - Patient is age 18 or older       Passed - Recent (6 mo) or future (30 days) visit within the authorizing provider's specialty    Patient had office visit in the last 6 months or has a visit in the next 30 days with authorizing provider or within the authorizing provider's specialty.  See \"Patient Info\" tab in inbasket, or \"Choose Columns\" in Meds & Orders section of the refill encounter.              " none

## 2018-10-03 ENCOUNTER — EMERGENCY (EMERGENCY)
Facility: HOSPITAL | Age: 28
LOS: 1 days | Discharge: HOME | End: 2018-10-03
Attending: EMERGENCY MEDICINE
Payer: MEDICAID

## 2018-10-03 VITALS
DIASTOLIC BLOOD PRESSURE: 97 MMHG | WEIGHT: 214.95 LBS | TEMPERATURE: 98 F | SYSTOLIC BLOOD PRESSURE: 139 MMHG | OXYGEN SATURATION: 98 % | HEART RATE: 119 BPM | HEIGHT: 73 IN | RESPIRATION RATE: 20 BRPM

## 2018-10-03 VITALS
RESPIRATION RATE: 16 BRPM | DIASTOLIC BLOOD PRESSURE: 92 MMHG | HEART RATE: 96 BPM | OXYGEN SATURATION: 95 % | SYSTOLIC BLOOD PRESSURE: 139 MMHG

## 2018-10-03 DIAGNOSIS — L02.413 CUTANEOUS ABSCESS OF RIGHT UPPER LIMB: Chronic | ICD-10-CM

## 2018-10-03 PROCEDURE — 99285 EMERGENCY DEPT VISIT HI MDM: CPT

## 2018-10-03 PROCEDURE — 99284 EMERGENCY DEPT VISIT MOD MDM: CPT | Mod: 25

## 2018-10-03 NOTE — ED PROVIDER NOTE - PSYCHIATRIC, MLM
Alert and oriented to person, place, time/situation. normal mood and affect. no apparent risk to self or others. bit hyper

## 2018-10-03 NOTE — ED ADULT NURSE REASSESSMENT NOTE - NS ED NURSE REASSESS COMMENT FT1
Looking for patient. As per BALDEMAR Mcduffie, Sai patient has been discharged by MD an hour ago and was given Narcan.

## 2018-10-03 NOTE — ED PROVIDER NOTE - OBJECTIVE STATEMENT
pt doing mollie for 3 days to stay alert, with friend who had heroin and injected himself accidentally overdosing.  Pt received naloxone by EMS. Pt has not used heroin regularly in a few months.  denies withdrawal. no dyspnea or other complaints.  Wants to leave.

## 2018-10-03 NOTE — ED PROVIDER NOTE - CONSTITUTIONAL, MLM
normal... Well appearing, well nourished, awake, alert, oriented to person, place, time/situation and in no apparent distress. cooperative

## 2018-10-03 NOTE — ED ADULT TRIAGE NOTE - CHIEF COMPLAINT QUOTE
Pt BIBA s/p heroin overdose. pt received two doses of narcan prior to arrival.  Pt is currently A&Ox3.  Pt reports doing mollie prior and hasn't slept for 3 days.  No acute distress noted. Pt BIBA s/p heroin overdose. pt received two doses of narcan prior to arrival.  Pt is currently A&Ox3.  Pt reports doing mollie prior and hasn't slept for 3 days.  No acute distress noted.  Pt calm and cooperative at this time.  Belongings collected, labeled and secured.

## 2018-10-03 NOTE — ED PROVIDER NOTE - MEDICAL DECISION MAKING DETAILS
pt awake, very alert, HR improved, pt given naloxone OD kit, declines referral for drug abuse treatment.  d/c home.

## 2018-11-03 ENCOUNTER — EMERGENCY (EMERGENCY)
Facility: HOSPITAL | Age: 28
LOS: 1 days | Discharge: HOME | End: 2018-11-03
Attending: EMERGENCY MEDICINE
Payer: MEDICAID

## 2018-11-03 VITALS — WEIGHT: 220.02 LBS | HEIGHT: 76 IN

## 2018-11-03 VITALS
DIASTOLIC BLOOD PRESSURE: 84 MMHG | OXYGEN SATURATION: 99 % | TEMPERATURE: 98 F | RESPIRATION RATE: 15 BRPM | HEART RATE: 91 BPM | SYSTOLIC BLOOD PRESSURE: 126 MMHG

## 2018-11-03 DIAGNOSIS — L02.413 CUTANEOUS ABSCESS OF RIGHT UPPER LIMB: Chronic | ICD-10-CM

## 2018-11-03 PROCEDURE — 99283 EMERGENCY DEPT VISIT LOW MDM: CPT | Mod: 25

## 2018-11-03 PROCEDURE — 73090 X-RAY EXAM OF FOREARM: CPT | Mod: 26,LT

## 2018-11-03 PROCEDURE — 29105 APPLICATION LONG ARM SPLINT: CPT | Mod: LT

## 2018-11-03 PROCEDURE — 73090 X-RAY EXAM OF FOREARM: CPT

## 2018-11-03 RX ORDER — OXYCODONE HYDROCHLORIDE 5 MG/1
1 TABLET ORAL
Qty: 8 | Refills: 0 | OUTPATIENT
Start: 2018-11-03 | End: 2018-11-04
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
## 2018-11-03 NOTE — ED ADULT NURSE NOTE - NSIMPLEMENTINTERV_GEN_ALL_ED
Implemented All Universal Safety Interventions:  Tad to call system. Call bell, personal items and telephone within reach. Instruct patient to call for assistance. Room bathroom lighting operational. Non-slip footwear when patient is off stretcher. Physically safe environment: no spills, clutter or unnecessary equipment. Stretcher in lowest position, wheels locked, appropriate side rails in place.
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
## 2018-11-06 ENCOUNTER — EMERGENCY (EMERGENCY)
Facility: HOSPITAL | Age: 28
LOS: 1 days | End: 2018-11-06
Payer: MEDICAID

## 2018-11-06 DIAGNOSIS — L02.413 CUTANEOUS ABSCESS OF RIGHT UPPER LIMB: Chronic | ICD-10-CM

## 2018-11-06 PROCEDURE — 29125 APPL SHORT ARM SPLINT STATIC: CPT | Mod: LT

## 2018-11-06 PROCEDURE — 99283 EMERGENCY DEPT VISIT LOW MDM: CPT | Mod: 25

## 2018-11-06 PROCEDURE — 73090 X-RAY EXAM OF FOREARM: CPT | Mod: 26,LT

## 2018-11-06 PROCEDURE — 73610 X-RAY EXAM OF ANKLE: CPT | Mod: 26,RT

## 2018-11-06 PROCEDURE — 73590 X-RAY EXAM OF LOWER LEG: CPT | Mod: 26,LT

## 2018-12-12 NOTE — PATIENT PROFILE ADULT. - TEACHING/LEARNING LEARNING PREFERENCES
Addended by: SCOT NEVES on: 12/12/2018 03:11 PM     Modules accepted: Orders     written material/verbal instruction

## 2019-01-18 NOTE — ED PROVIDER NOTE - CPE EDP CARDIAC NORM
Syncope vs Seizure vs. fall Syncope vs Seizure vs. fall Syncope vs Seizure vs. fall Syncope vs Seizure vs. fall Syncope vs Seizure vs. fall Syncope vs Seizure vs. fall Syncope vs Seizure vs. fall Syncope vs Seizure vs. fall Syncope vs Seizure vs. fall Syncope vs Seizure vs. fall Syncope vs Seizure vs. fall Syncope vs Seizure vs. fall Syncope vs Seizure vs. fall Syncope vs Seizure vs. fall Anemia Anemia Syncope vs Seizure vs. fall Syncope vs Seizure vs. fall Syncope vs Seizure vs. fall Syncope vs Seizure vs. fall Syncope vs Seizure vs. fall Syncope vs Seizure vs. fall Syncope vs Seizure vs. fall Syncope vs Seizure vs. fall normal...

## 2019-03-23 NOTE — PRE-OP CHECKLIST - LATEX ALLERGY
no
- - -
Rhombic Flap Text: The defect edges were debeveled with a #15 scalpel blade.  Given the location of the defect and the proximity to free margins a rhombic flap was deemed most appropriate.  Using a sterile surgical marker, an appropriate rhombic flap was drawn incorporating the defect.    The area thus outlined was incised deep to adipose tissue with a #15 scalpel blade.  The skin margins were undermined to an appropriate distance in all directions utilizing iris scissors.

## 2019-08-01 ENCOUNTER — EMERGENCY (EMERGENCY)
Facility: HOSPITAL | Age: 29
LOS: 1 days | End: 2019-08-01
Admitting: EMERGENCY MEDICINE
Payer: MEDICAID

## 2019-08-01 DIAGNOSIS — L02.413 CUTANEOUS ABSCESS OF RIGHT UPPER LIMB: Chronic | ICD-10-CM

## 2019-08-01 PROCEDURE — 99283 EMERGENCY DEPT VISIT LOW MDM: CPT

## 2020-03-05 ENCOUNTER — OUTPATIENT (OUTPATIENT)
Dept: OUTPATIENT SERVICES | Facility: HOSPITAL | Age: 30
LOS: 1 days | End: 2020-03-05
Payer: OTHER GOVERNMENT

## 2020-03-05 DIAGNOSIS — L02.413 CUTANEOUS ABSCESS OF RIGHT UPPER LIMB: Chronic | ICD-10-CM

## 2020-03-05 PROCEDURE — 73719 MRI LOWER EXTREMITY W/DYE: CPT | Mod: 26,LT

## 2020-05-03 ENCOUNTER — EMERGENCY (EMERGENCY)
Facility: HOSPITAL | Age: 30
LOS: 1 days | Discharge: HOME | End: 2020-05-03
Attending: EMERGENCY MEDICINE
Payer: SELF-PAY

## 2020-05-03 VITALS
TEMPERATURE: 98 F | HEART RATE: 92 BPM | WEIGHT: 220.02 LBS | RESPIRATION RATE: 18 BRPM | OXYGEN SATURATION: 98 % | DIASTOLIC BLOOD PRESSURE: 89 MMHG | HEIGHT: 76 IN | SYSTOLIC BLOOD PRESSURE: 136 MMHG

## 2020-05-03 DIAGNOSIS — L02.413 CUTANEOUS ABSCESS OF RIGHT UPPER LIMB: Chronic | ICD-10-CM

## 2020-05-03 LAB
ALBUMIN SERPL ELPH-MCNC: 4.6 G/DL — SIGNIFICANT CHANGE UP (ref 3.3–5.2)
ALP SERPL-CCNC: 46 U/L — SIGNIFICANT CHANGE UP (ref 40–120)
ALT FLD-CCNC: 18 U/L — SIGNIFICANT CHANGE UP
ANION GAP SERPL CALC-SCNC: 15 MMOL/L — SIGNIFICANT CHANGE UP (ref 5–17)
AST SERPL-CCNC: 20 U/L — SIGNIFICANT CHANGE UP
BASOPHILS # BLD AUTO: 0.03 K/UL — SIGNIFICANT CHANGE UP (ref 0–0.2)
BASOPHILS NFR BLD AUTO: 0.3 % — SIGNIFICANT CHANGE UP (ref 0–2)
BILIRUB SERPL-MCNC: 0.8 MG/DL — SIGNIFICANT CHANGE UP (ref 0.4–2)
BUN SERPL-MCNC: 9 MG/DL — SIGNIFICANT CHANGE UP (ref 8–20)
CALCIUM SERPL-MCNC: 9.5 MG/DL — SIGNIFICANT CHANGE UP (ref 8.6–10.2)
CHLORIDE SERPL-SCNC: 99 MMOL/L — SIGNIFICANT CHANGE UP (ref 98–107)
CO2 SERPL-SCNC: 26 MMOL/L — SIGNIFICANT CHANGE UP (ref 22–29)
CREAT SERPL-MCNC: 0.92 MG/DL — SIGNIFICANT CHANGE UP (ref 0.5–1.3)
CRP SERPL-MCNC: 0.99 MG/DL — HIGH (ref 0–0.4)
EOSINOPHIL # BLD AUTO: 0.07 K/UL — SIGNIFICANT CHANGE UP (ref 0–0.5)
EOSINOPHIL NFR BLD AUTO: 0.7 % — SIGNIFICANT CHANGE UP (ref 0–6)
ERYTHROCYTE [SEDIMENTATION RATE] IN BLOOD: 4 MM/HR — SIGNIFICANT CHANGE UP (ref 0–20)
GLUCOSE SERPL-MCNC: 122 MG/DL — HIGH (ref 70–99)
HCT VFR BLD CALC: 49 % — SIGNIFICANT CHANGE UP (ref 39–50)
HGB BLD-MCNC: 16.6 G/DL — SIGNIFICANT CHANGE UP (ref 13–17)
IMM GRANULOCYTES NFR BLD AUTO: 0.6 % — SIGNIFICANT CHANGE UP (ref 0–1.5)
LACTATE BLDV-MCNC: 1.4 MMOL/L — SIGNIFICANT CHANGE UP (ref 0.5–2)
LYMPHOCYTES # BLD AUTO: 1.54 K/UL — SIGNIFICANT CHANGE UP (ref 1–3.3)
LYMPHOCYTES # BLD AUTO: 14.7 % — SIGNIFICANT CHANGE UP (ref 13–44)
MCHC RBC-ENTMCNC: 31.6 PG — SIGNIFICANT CHANGE UP (ref 27–34)
MCHC RBC-ENTMCNC: 33.9 GM/DL — SIGNIFICANT CHANGE UP (ref 32–36)
MCV RBC AUTO: 93.3 FL — SIGNIFICANT CHANGE UP (ref 80–100)
MONOCYTES # BLD AUTO: 0.68 K/UL — SIGNIFICANT CHANGE UP (ref 0–0.9)
MONOCYTES NFR BLD AUTO: 6.5 % — SIGNIFICANT CHANGE UP (ref 2–14)
NEUTROPHILS # BLD AUTO: 8.12 K/UL — HIGH (ref 1.8–7.4)
NEUTROPHILS NFR BLD AUTO: 77.2 % — HIGH (ref 43–77)
PLATELET # BLD AUTO: 266 K/UL — SIGNIFICANT CHANGE UP (ref 150–400)
POTASSIUM SERPL-MCNC: 3.9 MMOL/L — SIGNIFICANT CHANGE UP (ref 3.5–5.3)
POTASSIUM SERPL-SCNC: 3.9 MMOL/L — SIGNIFICANT CHANGE UP (ref 3.5–5.3)
PROT SERPL-MCNC: 7.9 G/DL — SIGNIFICANT CHANGE UP (ref 6.6–8.7)
RBC # BLD: 5.25 M/UL — SIGNIFICANT CHANGE UP (ref 4.2–5.8)
RBC # FLD: 12.2 % — SIGNIFICANT CHANGE UP (ref 10.3–14.5)
SODIUM SERPL-SCNC: 140 MMOL/L — SIGNIFICANT CHANGE UP (ref 135–145)
WBC # BLD: 10.5 K/UL — SIGNIFICANT CHANGE UP (ref 3.8–10.5)
WBC # FLD AUTO: 10.5 K/UL — SIGNIFICANT CHANGE UP (ref 3.8–10.5)

## 2020-05-03 PROCEDURE — 73140 X-RAY EXAM OF FINGER(S): CPT

## 2020-05-03 PROCEDURE — 87186 SC STD MICRODIL/AGAR DIL: CPT

## 2020-05-03 PROCEDURE — 80053 COMPREHEN METABOLIC PANEL: CPT

## 2020-05-03 PROCEDURE — 87040 BLOOD CULTURE FOR BACTERIA: CPT

## 2020-05-03 PROCEDURE — 99284 EMERGENCY DEPT VISIT MOD MDM: CPT

## 2020-05-03 PROCEDURE — 73140 X-RAY EXAM OF FINGER(S): CPT | Mod: 26,RT

## 2020-05-03 PROCEDURE — 83605 ASSAY OF LACTIC ACID: CPT

## 2020-05-03 PROCEDURE — 99284 EMERGENCY DEPT VISIT MOD MDM: CPT | Mod: 25

## 2020-05-03 PROCEDURE — 87070 CULTURE OTHR SPECIMN AEROBIC: CPT

## 2020-05-03 PROCEDURE — 86140 C-REACTIVE PROTEIN: CPT

## 2020-05-03 PROCEDURE — 36415 COLL VENOUS BLD VENIPUNCTURE: CPT

## 2020-05-03 PROCEDURE — 96374 THER/PROPH/DIAG INJ IV PUSH: CPT

## 2020-05-03 PROCEDURE — 85652 RBC SED RATE AUTOMATED: CPT

## 2020-05-03 PROCEDURE — 85027 COMPLETE CBC AUTOMATED: CPT

## 2020-05-03 PROCEDURE — 96375 TX/PRO/DX INJ NEW DRUG ADDON: CPT

## 2020-05-03 RX ORDER — LACTOBACILLUS ACIDOPHILUS 100MM CELL
2 CAPSULE ORAL
Qty: 60 | Refills: 0
Start: 2020-05-03 | End: 2020-06-01

## 2020-05-03 RX ORDER — KETOROLAC TROMETHAMINE 30 MG/ML
1 SYRINGE (ML) INJECTION
Qty: 20 | Refills: 0
Start: 2020-05-03 | End: 2020-05-07

## 2020-05-03 RX ORDER — KETOROLAC TROMETHAMINE 30 MG/ML
30 SYRINGE (ML) INJECTION ONCE
Refills: 0 | Status: DISCONTINUED | OUTPATIENT
Start: 2020-05-03 | End: 2020-05-03

## 2020-05-03 RX ADMIN — Medication 30 MILLIGRAM(S): at 11:33

## 2020-05-03 RX ADMIN — Medication 100 MILLIGRAM(S): at 11:34

## 2020-05-03 NOTE — ED PROVIDER NOTE - PHYSICAL EXAMINATION
Constitutional - well-developed; well nourished. Head - NCAT. Airway patent. Eyes - PERRL. CV - RRR. no murmur. no edema. Pulm - CTAB. Abd - soft, nt. no rebound. no guarding. Neuro - A&Ox3. strength 5/5 x4. sensation intact x4. normal gait. Skin - R middle finger +ulceration to dorsal aspect of middle phalynx at knuckle with break in skin circular with purulent discharge, digit swollen, red, TTP, unable to fully flex digit, motor and sensory sensation intact, radial pulse intact.

## 2020-05-03 NOTE — ED PROVIDER NOTE - CARE PROVIDER_API CALL
Gay Yanez)  Plastic Surgery; Surgery of the Hand  [address] [place] [address]  Phone: [phone]  Fax: [phone]  Follow Up Time:

## 2020-05-03 NOTE — ED PROVIDER NOTE - PATIENT PORTAL LINK FT
You can access the FollowMyHealth Patient Portal offered by [facility] by registering at the following website: [email or web address]. By joining [email or web address] FollowMyHealth portal, you will also be able to view your health information using other applications (apps) compatible with our system.

## 2020-05-03 NOTE — CONSULT NOTE ADULT - SUBJECTIVE AND OBJECTIVE BOX
Pt Name: CARLYN NAGY    MRN: [id number]      Patient is a 29y Male pmhx IVDA presenting to the emergency department with a chief complaint of right 3rd digit pain. Patient reports he is  and developed a draining wound on the dorsal aspect of right distal 1/3rd of the 3rd digit x 4 days ago. Patient reports he may have cut his finger while working. Denies fever/chills. Denies recent IVD use. Denies numbness or tingling. Patient is right hand dominant.     REVIEW OF SYSTEMS    General: Alert, responsive, in NAD    Skin/Breast: No rashes, no pruritis     Musculoskeletal: SEE HPI.    Neurological: No sensory or motor changes.         PAST MEDICAL & SURGICAL HISTORY:  PAST MEDICAL & SURGICAL HISTORY:  Abscess  Heroin abuse  Abscess of arm, right: s/p I&amp;D      Allergies: No Known Allergies      Medications: clindamycin IVPB      clindamycin IVPB 600 milliGRAM(s) IV Intermittent every 8 hours      FAMILY HISTORY:  No pertinent family history in first degree relatives  : non-contributory    Social History:                           16.6   10.50 )-----------( 266      ( 03 May 2020 11:39 )             49.0       05-03    140  |  99  |  9.0  ----------------------------<  122<H>  3.9   |  26.0  |  0.92    Ca    9.5      03 May 2020 11:39    TPro  7.9  /  Alb  4.6  /  TBili  0.8  /  DBili  x   /  AST  20  /  ALT  18  /  AlkPhos  46  05-03      Vital Signs Last 24 Hrs  T(C): 36.7 (03 May 2020 09:43), Max: 36.7 (03 May 2020 09:43)  T(F): 98 (03 May 2020 09:43), Max: 98 (03 May 2020 09:43)  HR: 92 (03 May 2020 09:43) (92 - 92)  BP: 136/89 (03 May 2020 09:43) (136/89 - 136/89)  BP(mean): --  RR: 18 (03 May 2020 09:43) (18 - 18)  SpO2: 98% (03 May 2020 09:43) (98% - 98%)    Daily Height in cm: 193.04 (03 May 2020 09:43)    Daily       PHYSICAL EXAM:      Appearance: Alert, responsive, in no acute distress.    Neurological: Sensation is grossly intact to light touch. No focal deficits or weaknesses found.    Vascular: 2+ distal pulses. Cap refill < 2 sec. No signs of venous insufficiency or stasis. No extremity ulcerations. No cyanosis.    Musculoskeletal:         Left Upper Extremity: Shoulder: NTTP, FROM. Abduction/adduction/flexion/extension intact. Elbow: NTTP, FROM. EE/EF intact. Wrist: NTTP, FROM. WE/WF intact. Hand: NTTP throughout, FROM. Abduction/adduction/flexion/extension of digits 1-5 intact. 3rd digit: + flexion/extension with minimal pain. + swelling. + ulceration to dorsal aspect of distal 1/3rd digit with minimal serous drainage. +surrounding erythema. No fluctuance appreciated.  Compartments soft compressible. Sensation intact to light touch. No erythematous tracking noted    Imaging Studies:    < from: Xray Finger, Right Hand (05.03.20 @ 10:43) >     EXAM:  FINGER(S) RIGHT HAND                          PROCEDURE DATE:  05/03/2020          INTERPRETATION:  CLINICAL INDICATION:  Right finger pain    COMPARISON:  None    TECHNIQUE:  AP, oblique, lateral views right third digit.    FINDINGS:  There is no evidence for acute fracture, dislocation, joint space narrowing or retained radiodense foreign body. Soft tissue swelling of the right third digit identified. A cutaneous skin defect along the dorsal surface of the right third digit cannot be excluded; correlate clinically.    IMPRESSION:  Soft tissue swelling. No evidence for acute fracture or dislocation.                  BRIGIDA LEDESMA M.D., ATTENDING RADIOLOGIST  This document has been electronically signed. May  3 2020 10:57AM        < end of copied text >    A/P:  Pt is a  29y Male with dorsal 3rd digit wound     PLAN:   - pain control   - TID betadine/saline soaks   - Oral abx   - elevation   - wound care   - plan d/w Dr. Yanez  - F/u in the office in 3-5 days with Dr. Magana Pt Name: CARLYN NAGY    MRN: [id number]      Patient is a 29y Male pmhx IVDA presenting to the emergency department with a chief complaint of right 3rd digit pain. Patient reports he is  and developed a draining wound on the dorsal aspect of right distal 1/3rd of the 3rd digit x 4 days ago. Patient reports he may have cut his finger while working. Denies fever/chills. Denies recent IVD use. Denies numbness or tingling. Patient is right hand dominant.     REVIEW OF SYSTEMS    General: Alert, responsive, in NAD    Skin/Breast: No rashes, no pruritis     Musculoskeletal: SEE HPI.    Neurological: No sensory or motor changes.         PAST MEDICAL & SURGICAL HISTORY:  PAST MEDICAL & SURGICAL HISTORY:  Abscess  Heroin abuse  Abscess of arm, right: s/p I&amp;D      Allergies: No Known Allergies      Medications: clindamycin IVPB      clindamycin IVPB 600 milliGRAM(s) IV Intermittent every 8 hours      FAMILY HISTORY:  No pertinent family history in first degree relatives  : non-contributory    Social History:                           16.6   10.50 )-----------( 266      ( 03 May 2020 11:39 )             49.0       05-03    140  |  99  |  9.0  ----------------------------<  122<H>  3.9   |  26.0  |  0.92    Ca    9.5      03 May 2020 11:39    TPro  7.9  /  Alb  4.6  /  TBili  0.8  /  DBili  x   /  AST  20  /  ALT  18  /  AlkPhos  46  05-03      Vital Signs Last 24 Hrs  T(C): 36.7 (03 May 2020 09:43), Max: 36.7 (03 May 2020 09:43)  T(F): 98 (03 May 2020 09:43), Max: 98 (03 May 2020 09:43)  HR: 92 (03 May 2020 09:43) (92 - 92)  BP: 136/89 (03 May 2020 09:43) (136/89 - 136/89)  BP(mean): --  RR: 18 (03 May 2020 09:43) (18 - 18)  SpO2: 98% (03 May 2020 09:43) (98% - 98%)    Daily Height in cm: 193.04 (03 May 2020 09:43)    Daily       PHYSICAL EXAM:      Appearance: Alert, responsive, in no acute distress.    Neurological: Sensation is grossly intact to light touch. No focal deficits or weaknesses found.    Vascular: 2+ distal pulses. Cap refill < 2 sec. No signs of venous insufficiency or stasis. No extremity ulcerations. No cyanosis.    Musculoskeletal:         Left Upper Extremity: Shoulder: NTTP, FROM. Abduction/adduction/flexion/extension intact. Elbow: NTTP, FROM. EE/EF intact. Wrist: NTTP, FROM. WE/WF intact. Hand: NTTP throughout, FROM. Abduction/adduction/flexion/extension of digits 1-5 intact. 3rd digit: + flexion/extension with minimal pain. + swelling. + ulceration to dorsal aspect of distal 1/3rd digit with minimal serous drainage. +surrounding erythema. No fluctuance appreciated.  Compartments soft compressible. Sensation intact to light touch. No erythematous tracking noted    Imaging Studies:    < from: Xray Finger, Right Hand (05.03.20 @ 10:43) >     EXAM:  FINGER(S) RIGHT HAND                          PROCEDURE DATE:  05/03/2020          INTERPRETATION:  CLINICAL INDICATION:  Right finger pain    COMPARISON:  None    TECHNIQUE:  AP, oblique, lateral views right third digit.    FINDINGS:  There is no evidence for acute fracture, dislocation, joint space narrowing or retained radiodense foreign body. Soft tissue swelling of the right third digit identified. A cutaneous skin defect along the dorsal surface of the right third digit cannot be excluded; correlate clinically.    IMPRESSION:  Soft tissue swelling. No evidence for acute fracture or dislocation.                  BRIGIDA LEDESMA M.D., ATTENDING RADIOLOGIST  This document has been electronically signed. May  3 2020 10:57AM        < end of copied text >    A/P:  Pt is a  29y Male with dorsal 3rd digit wound     PLAN:   - pain control   - TID betadine/saline soaks   - Oral abx   - elevation   - wound care   - plan d/w Dr. Yanez  - return to ER if symptoms persist or symptoms do not improve  - F/u in the office in 3-5 days with Dr. Magana

## 2020-05-03 NOTE — ED PROVIDER NOTE - PROGRESS NOTE DETAILS
old chart reviewed +heroin abuse and abscess in past old culture grew strep viridians sensitive to clinda, was admitted in past, seen by hand in past for abscess I&D hand consulted, labs reviewed WBC WNL, no lactate hand consult reccomended soaks with betadine and saline and PO abx and outpatient follow up with hand MD BLACK, given f/u and copies of all results

## 2020-05-03 NOTE — ED ADULT TRIAGE NOTE - CHIEF COMPLAINT QUOTE
right middle finger middle knuckle pain with injury unknown penetration. patient c/o throbbing pain, last tentanus in august 2019. minimal secretions from wound. decreased range of motion

## 2020-05-03 NOTE — ED PROVIDER NOTE - NS ED ROS FT
No fever/chills, No photophobia/eye pain/changes in vision, No ear pain/sore throat/dysphagia, No chest pain/palpitations, no SOB/cough/wheeze/stridor, No abdominal pain, No N/V/D, no dysuria/frequency/discharge, +R middle finger pain, No neck/back pain, no rash, no changes in neurological status/function.

## 2020-05-03 NOTE — ED PROVIDER NOTE - OBJECTIVE STATEMENT
This is a 29 year old male with c/o R middle finger pain x 4 days.  He reports works as a  and thinks a rock or glass is inside his skin.  He notes the skin broke and now has discharge.  He notes redness is extending past knuckle to base of finger.  He notes pain with movement.  He denies any trauma or falls.  He denies any fevers, chills, n/v/d or any abdominal pain, sick contacts, recent travel or rashes.  He reports remote history of MRSA with girlfriend.  Patient is R handed, smoker, and drinks socially (states to "ease the pain") and denies any illicit drug use.

## 2020-05-05 LAB
-  AMPICILLIN/SULBACTAM: SIGNIFICANT CHANGE UP
-  CEFAZOLIN: SIGNIFICANT CHANGE UP
-  CLINDAMYCIN: SIGNIFICANT CHANGE UP
-  DAPTOMYCIN: SIGNIFICANT CHANGE UP
-  ERYTHROMYCIN: SIGNIFICANT CHANGE UP
-  GENTAMICIN: SIGNIFICANT CHANGE UP
-  LINEZOLID: SIGNIFICANT CHANGE UP
-  OXACILLIN: SIGNIFICANT CHANGE UP
-  PENICILLIN: SIGNIFICANT CHANGE UP
-  RIFAMPIN: SIGNIFICANT CHANGE UP
-  TETRACYCLINE: SIGNIFICANT CHANGE UP
-  TRIMETHOPRIM/SULFAMETHOXAZOLE: SIGNIFICANT CHANGE UP
-  VANCOMYCIN: SIGNIFICANT CHANGE UP
CULTURE RESULTS: SIGNIFICANT CHANGE UP
METHOD TYPE: SIGNIFICANT CHANGE UP
ORGANISM # SPEC MICROSCOPIC CNT: SIGNIFICANT CHANGE UP
ORGANISM # SPEC MICROSCOPIC CNT: SIGNIFICANT CHANGE UP
SPECIMEN SOURCE: SIGNIFICANT CHANGE UP

## 2020-05-07 NOTE — ED POST DISCHARGE NOTE - RESULT SUMMARY
Wound culture +MRSA, pt given clindamycin in ED and rx for home. C&S results show +sensitivity to clindamycin

## 2020-05-08 LAB
CULTURE RESULTS: SIGNIFICANT CHANGE UP
CULTURE RESULTS: SIGNIFICANT CHANGE UP
SPECIMEN SOURCE: SIGNIFICANT CHANGE UP
SPECIMEN SOURCE: SIGNIFICANT CHANGE UP

## 2020-07-30 NOTE — DISCHARGE NOTE ADULT - FUNCTIONAL SCREEN CURRENT LEVEL: AMBULATION, MLM
Problem: Adult Inpatient Plan of Care  Goal: Plan of Care Review  7/30/2020 1701 by Cindy Vickers, RN  Outcome: Ongoing, Progressing  Flowsheets (Taken 7/30/2020 1605)  Plan of Care Reviewed With: patient   Pt tolerated her Tecentriq infusion well, NAD. No new c/o voiced. Pt given a schedule and reviewed, pt verbalized understanding. Pt ambulated out of the clinic without difficulty.       (0) independent

## 2021-03-19 NOTE — ED ADULT TRIAGE NOTE - HEART RATE (BEATS/MIN)
and S2.  ABDOMEN:  Soft. NEUROLOGIC:  She is confused, but she moves limbs. LABORATORY DATA:  Blood chemistry:  Her BUN is 10, creatinine 0.7. Random blood sugar 136. Liver profile: Albumin 2.9, alkaline  phosphatase 237, bilirubin 1.2.  CBC:  On admission, white blood cells  0.3, today 0.5; hemoglobin 10.9 gm; hematocrit 34%; and platelet is 99. IMPRESSION:  This is a 57-year-old female who was admitted because of  change in mental status and severe neutropenia and fever. The patient  was covered with antibiotic. The patient also is suspected to have  malfunction of the ventriculoperitoneal shunt. I will start the patient  on white blood cell growth factor Neupogen. I will request Neurosurgery  consult. I will keep her for the time being on the immunosuppressant,  but if the patient does not have enough response, I may need to change  in the medicine. We thank you for the consult.         Chava Laureano M.D.    D: 03/19/2021 12:52:14       T: 03/19/2021 14:05:36     [email or web address]  Job#: [id number]     Doc#: [id number]    CC: 119

## 2021-10-13 NOTE — ED STATDOCS - CHIEF COMPLAINT
The patient is a 26y year old Male complaining of pain, arm. Spironolactone Counseling: Patient advised regarding risks of diarrhea, abdominal pain, hyperkalemia, birth defects (for female patients), liver toxicity and renal toxicity. The patient may need blood work to monitor liver and kidney function and potassium levels while on therapy. The patient verbalized understanding of the proper use and possible adverse effects of spironolactone.  All of the patient's questions and concerns were addressed.

## 2022-01-01 NOTE — H&P ADULT - SKIN
General Pediatric Acute Visit Note      Chief Complaint:     Chief Complaint   Patient presents with   â¢ Well Infant Birth to 21 Months         History of Present Illness:    11day-old former 40 week infant presenting today for follow-up visit for jaundice. Yesterday her bilirubin level returned at 13.8 at 92 hours which was low intermediate risk but below threshold for inpatient phototherapy of 14.4. I did place an order for a bilibed whicharrived to the home around 2:30 p.m. [facility] The family reports they have been using it consistently. They are unsure if the patient has had any improvement with her jaundice. The patient is currently drinking expressed breast milk 40 mL per feeding every 3 hours. The mother has had a hard time producing enough expressed breast milk and so they have been supplementing with formula as well. The patient's weight is 15 g below the last visit and is currently 8% below birth weight. In the past 24 hours she has had 6 wet diapers and 4 bowel movements. Stools have been dark but may be taking on a more brown-green color. Stools are nonbloody. The patient spit-up 2 times last night. Spit-up is nonbloody, nonbilious, nonprojectile, and not painful. There has been no history of fever, no hypothermia, no conjunctival injection, no cyanosis or fatiguing with feeds, no increased work of breathing, no hard/tender abdomen,  no blood in urine or stool, and no rashes. Patient Active Problem List   Diagnosis   â¢ Single liveborn, born in hospital, delivered by vaginal delivery   â¢ SGA (small for gestational age)   â¢ Steve positive         No current outpatient medications on file. No current facility-administered medications for this visit. ALLERGIES:  No Known Allergies      No past medical history on file. No past surgical history on file. No family history on file.       Social History     Socioeconomic History   â¢ Marital status: Single     Spouse name: Not "on file   â¢ Number of children: Not on file   â¢ Years of education: Not on file   â¢ Highest education level: Not on file   Occupational History   â¢ Not on file   Tobacco Use   â¢ Smoking status: Never Smoker   â¢ Smokeless tobacco: Never Used   Substance and Sexual Activity   â¢ Alcohol use: Not on file   â¢ Drug use: Not on file   â¢ Sexual activity: Not on file   Other Topics Concern   â¢ Not on file   Social History Narrative   â¢ Not on file     Social Determinants of Health     Financial Resource Strain: Not on file   Food Insecurity: Not on file   Transportation Needs: Not on file   Physical Activity: Not on file   Stress: Not on file   Social Connections: Not on file   Intimate Partner Violence: Not on file         Most Recent Immunizations   Administered Date(s) Administered   â¢ Hep B, adolescent or pediatric 2022   Deferred Date(s) Deferred   â¢ Hepatitis B Immune Globulin 2022     Immunizations up to date? Yes      Physical Exam:    VITALS:    Visit Vitals  Pulse 158   Resp 52   Ht 18.5"" (47 cm)   Wt (!) 2.315 kg (5 lb 1.7 oz)   HC 33 cm (12.99\"")   BMI 10.48 kg/mÂ²       GENERAL:  Awake, alert, well-appearing, no acute distress. HEAD:  Normocephalic, atraumatic. Anterior fontanelle is open, soft, and flat. EYES:  No conjunctival injection. No scleral icterus. Red reflex is present and equal bilaterally. EARS:  Pinnae and external ear canals normal.  NOSE:  No nasal drainage or congestion. THROAT:  Moist mucous membranes. No oral lesions. Palate intact. NECK:  Clavicles intact bilaterally. HEART:  Regular rate and rhythm. Normal S1, S2. No murmurs, rubs, or gallops. Femoral pulse 2+. LUNGS:  Normal work of breathing. No nasal flaring, no retractions. Good air movement. Clear to auscultation bilaterally. No wheezing or rales. ABDOMEN:  Normoactive bowel sounds. Abdomen is soft, non-tender, and non-distended. No hepatosplenomegaly. GENITOURINARY:  Normal genitalia, anus patent.  " MUSCULOSKELETAL:  Normal range of motion throughout, no joint swelling, no extremity pain. Ortolani and Zuniga negative. Spinal cord straight and intact. No sacral dimple or hair tuft. NEUROLOGIC:  Awake and alert. Normal tone, moving all extremities equally well. SKIN:  Moderate jaundice to the abdomen with some clearing throughout the back. No rash, otherwise normal to inspection. Diagnostic Tests:      - total bilirubin level      Assessment:    5 day old  female presenting today for follow-up visit for jaundice. Her bilirubin level returned at 12.3 which is an improvement from yesterday and below threshold for inpatient phototherapy of 15. Plan:    - I recommended the family continue with the bilibed for now. I educated them about appropriate feeding volumes in a child this age and recommended they try to increase to 60 mL per feeding as the standard. They should follow-up in 48 hours for weight and jaundice check. They should notify me sooner if the patient were to have fever, hypothermia, worsening jaundice, difficulty feeding, or any other concerns. detailed exam

## 2022-04-13 NOTE — ED ADULT TRIAGE NOTE - NS ED NOTE AC HIGH RISK COUNTRIES
SPIRITUAL HEALTH SERVICES Significant Event  Rastafari Sacrament of Reconciliation  [facility] ([facility] 3AW    Pt received sacrament from Father Lisa Eubanks   Pager [phone]    No

## 2022-09-27 NOTE — ED ADULT NURSE NOTE - BREATHING, MLM
[FreeTextEntry1] : Blood work reviewed with patient. LDL was slightly elevated and will monitor. No vitamin B 1 resulted and patient is aware that we will call with any deficiency.
Spontaneous, unlabored and symmetrical

## 2023-02-03 NOTE — H&P ADULT - SPO2 (%)
Consult received from provider, requesting MSW to assist Mother with financial needs expressed via the SDOH screening at office visit  Mother is [place] speaking only  MSW spoke with mom via phone call, introduced self, role and reason for calling  Mother reported, she and Dad are not working presently  Mother receives SNAP's benefits and they have transportation to medical appts  Mother requesting assitance with electric and water bill  Family does not own the residence, they are currently renting  Per Mother, she received assistance from the [employer] in the past   Ana Bashir is up-to-date  Mother reported, Bio-Dad was "let-go" from his job  He started receiving unemployment benefits, but benefits were discontinued, mother and dad, don't know reason for same  Mother informed, will make referral to [facility] to assist family with [facility] application if they meet criteria  Bio-Dad encouraged to contact [employer] to find out reason for unemployment benefits discontinued  Mother agreed with plan  MSW will remain available as needed 
98

## 2023-03-06 NOTE — ED STATDOCS - PROGRESS NOTE DETAILS
The below lab results are within anesthesia limits. K+ results reported to Maggi Daly NP via staff messaging.       Latest Reference Range & Units 3/6/23 09:10   Sodium 133 - 143 mmol/L 135   Potassium 3.5 - 5.1 mmol/L 3.1 (L)   Chloride 101 - 110 mmol/L 102   CO2 21 - 32 mmol/L 31   BUN,BUNPL 8 - 23 MG/DL 17   Creatinine 0.6 - 1.0 MG/DL 0.76   Anion Gap 2 - 11 mmol/L 2   Est, Glom Filt Rate >60 ml/min/1.73m2 >60   Glucose, Random 65 - 100 mg/dL 101 (H)   CALCIUM, SERUM, 126944 8.3 - 10.4 MG/DL 9.7   Albumin 3.2 - 4.6 g/dL 3.8   Hemoglobin A1C 4.8 - 5.6 % 5.0   eAG (mg/dL) mg/dL 97   WBC 4.3 - 11.1 K/uL 7.8   RBC 4.05 - 5.2 M/uL 4.99   Hemoglobin Quant 11.7 - 15.4 g/dL 14.0   Hematocrit 35.8 - 46.3 % 45.9   MCV 82.0 - 102.0 FL 92.0   MCH 26.1 - 32.9 PG 28.1   MCHC 31.4 - 35.0 g/dL 30.5 (L)   MPV 9.4 - 12.3 FL 10.4   RDW 11.9 - 14.6 % 14.7 (H)   Platelet Count 397 - 450 K/uL 220   Absolute Mono # 0.1 - 1.3 K/UL 0.4   Eosinophils % 0.5 - 7.8 % 3   Basophils Absolute 0.0 - 0.2 K/UL 0.0   Differential Type -   AUTOMATED   Seg Neutrophils 43 - 78 % 79 (H)   Segs Absolute 1.7 - 8.2 K/UL 6.1   Lymphocytes 13 - 44 % 12 (L)   Absolute Lymph # 0.5 - 4.6 K/UL 0.9   Monocytes 4.0 - 12.0 % 5   Absolute Eos # 0.0 - 0.8 K/UL 0.2   Basophils 0.0 - 2.0 % 0   Immature Granulocytes 0.0 - 5.0 % 0   Nucleated Red Blood Cells 0.0 - 0.2 K/uL 0.00   Absolute Immature Granulocyte 0.0 - 0.5 K/UL 0.0   Prothrombin Time 12.6 - 14.3 sec 13.2   INR -   1.0   PTT 24.5 - 34.2 SEC 32.6   MSSA/MRSA SCREEN BY PCR  Rpt   Special Requests -   NO SPECIAL REQUESTS   (L): Data is abnormally low  (H): Data is abnormally high  Rpt: View report in Results Review for more information 27 y/o M pt w/ PMHx of presents to the ED c/o L arm swelling, erythema and pain radiating to L hand x2 days. Pt states that he was seen at [facility] a few days ago and was given abx; pt states his abx are not working. Pt admits to IVDA but states he has not injected in his L arm recently. Pt denies fever, chills, vomiting, drainage, or any other complaints. NKDA. Focused evaluation, protocol orders entered, placed in main for complete evaluation by another provider.

## 2023-09-12 NOTE — ED ADULT NURSE NOTE - CAS TRG GEN SKIN CONDITION
Pt called stated rx for lorazepam was called in for 1 time a day, instead of 1 pill three times a day?  Please clarify    Please call pt at  [phone]   Warm

## 2023-12-15 NOTE — ED STATDOCS - SCRIBE NAME
Dr. Bonilla's office will contact you with the results of your sleep study within a week.  If you don't hear from Dr. Bonilla's staff please contact them at [phone].  
Alcides Rizzo
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
## 2024-11-25 NOTE — ED STATDOCS - MUSCULOSKELETAL, MLM
06-Dec-2024 FROM of right elbow. No crepitus. Erythema palpable to right antecubital. No appreciable foreign body. No lymphangitis to right forearm and arm.
